# Patient Record
Sex: FEMALE | Race: BLACK OR AFRICAN AMERICAN | NOT HISPANIC OR LATINO | Employment: FULL TIME | URBAN - METROPOLITAN AREA
[De-identification: names, ages, dates, MRNs, and addresses within clinical notes are randomized per-mention and may not be internally consistent; named-entity substitution may affect disease eponyms.]

---

## 2018-01-20 ENCOUNTER — HOSPITAL ENCOUNTER (EMERGENCY)
Facility: CLINIC | Age: 28
Discharge: HOME | End: 2018-01-21
Attending: FAMILY MEDICINE | Admitting: FAMILY MEDICINE
Payer: COMMERCIAL

## 2018-01-20 DIAGNOSIS — R10.11 ABDOMINAL PAIN, RIGHT UPPER QUADRANT: ICD-10-CM

## 2018-01-20 LAB
ALBUMIN UR-MCNC: NEGATIVE MG/DL
APPEARANCE UR: CLEAR
BASOPHILS # BLD AUTO: 0 10E9/L (ref 0–0.2)
BASOPHILS NFR BLD AUTO: 0.3 %
BILIRUB UR QL STRIP: NEGATIVE
COLOR UR AUTO: YELLOW
DIFFERENTIAL METHOD BLD: NORMAL
EOSINOPHIL # BLD AUTO: 0.1 10E9/L (ref 0–0.7)
EOSINOPHIL NFR BLD AUTO: 1.5 %
ERYTHROCYTE [DISTWIDTH] IN BLOOD BY AUTOMATED COUNT: 12.9 % (ref 10–15)
GLUCOSE UR STRIP-MCNC: NEGATIVE MG/DL
HCG UR QL: NEGATIVE
HCT VFR BLD AUTO: 39.6 % (ref 35–47)
HGB BLD-MCNC: 12.9 G/DL (ref 11.7–15.7)
HGB UR QL STRIP: NEGATIVE
IMM GRANULOCYTES # BLD: 0 10E9/L (ref 0–0.4)
IMM GRANULOCYTES NFR BLD: 0.1 %
KETONES UR STRIP-MCNC: 5 MG/DL
LEUKOCYTE ESTERASE UR QL STRIP: NEGATIVE
LYMPHOCYTES # BLD AUTO: 2.4 10E9/L (ref 0.8–5.3)
LYMPHOCYTES NFR BLD AUTO: 35.4 %
MCH RBC QN AUTO: 29.1 PG (ref 26.5–33)
MCHC RBC AUTO-ENTMCNC: 32.6 G/DL (ref 31.5–36.5)
MCV RBC AUTO: 89 FL (ref 78–100)
MONOCYTES # BLD AUTO: 0.7 10E9/L (ref 0–1.3)
MONOCYTES NFR BLD AUTO: 9.6 %
NEUTROPHILS # BLD AUTO: 3.7 10E9/L (ref 1.6–8.3)
NEUTROPHILS NFR BLD AUTO: 53.1 %
NITRATE UR QL: NEGATIVE
NRBC # BLD AUTO: 0 10*3/UL
NRBC BLD AUTO-RTO: 0 /100
PH UR STRIP: 5.5 PH (ref 5–7)
PLATELET # BLD AUTO: 200 10E9/L (ref 150–450)
RBC # BLD AUTO: 4.43 10E12/L (ref 3.8–5.2)
SOURCE: ABNORMAL
SP GR UR STRIP: 1.01 (ref 1–1.03)
UROBILINOGEN UR STRIP-MCNC: NORMAL MG/DL (ref 0–2)
WBC # BLD AUTO: 6.9 10E9/L (ref 4–11)

## 2018-01-20 PROCEDURE — 80053 COMPREHEN METABOLIC PANEL: CPT | Performed by: STUDENT IN AN ORGANIZED HEALTH CARE EDUCATION/TRAINING PROGRAM

## 2018-01-20 PROCEDURE — 81025 URINE PREGNANCY TEST: CPT | Performed by: STUDENT IN AN ORGANIZED HEALTH CARE EDUCATION/TRAINING PROGRAM

## 2018-01-20 PROCEDURE — 85025 COMPLETE CBC W/AUTO DIFF WBC: CPT | Performed by: STUDENT IN AN ORGANIZED HEALTH CARE EDUCATION/TRAINING PROGRAM

## 2018-01-20 PROCEDURE — 81003 URINALYSIS AUTO W/O SCOPE: CPT | Performed by: STUDENT IN AN ORGANIZED HEALTH CARE EDUCATION/TRAINING PROGRAM

## 2018-01-20 PROCEDURE — 25000132 ZZH RX MED GY IP 250 OP 250 PS 637: Performed by: STUDENT IN AN ORGANIZED HEALTH CARE EDUCATION/TRAINING PROGRAM

## 2018-01-20 PROCEDURE — 99284 EMERGENCY DEPT VISIT MOD MDM: CPT | Mod: GC | Performed by: FAMILY MEDICINE

## 2018-01-20 PROCEDURE — 83690 ASSAY OF LIPASE: CPT | Performed by: STUDENT IN AN ORGANIZED HEALTH CARE EDUCATION/TRAINING PROGRAM

## 2018-01-20 PROCEDURE — 99285 EMERGENCY DEPT VISIT HI MDM: CPT | Performed by: FAMILY MEDICINE

## 2018-01-20 RX ORDER — HYDROXYZINE HYDROCHLORIDE 25 MG/1
25 TABLET, FILM COATED ORAL ONCE
Status: COMPLETED | OUTPATIENT
Start: 2018-01-20 | End: 2018-01-20

## 2018-01-20 RX ADMIN — HYDROXYZINE HYDROCHLORIDE 25 MG: 25 TABLET ORAL at 23:36

## 2018-01-20 ASSESSMENT — ENCOUNTER SYMPTOMS
CONFUSION: 0
NAUSEA: 1
COLOR CHANGE: 0
CHILLS: 1
FEVER: 0
NECK STIFFNESS: 0
ABDOMINAL DISTENTION: 1
ABDOMINAL PAIN: 1
SHORTNESS OF BREATH: 0
DIARRHEA: 0
HEADACHES: 0
VOMITING: 0
DYSURIA: 0
ARTHRALGIAS: 0
EYE REDNESS: 0

## 2018-01-20 NOTE — ED AVS SNAPSHOT
[facility], Emergency Department    [address]    [place] [address]    Phone:  [phone]    Fax:  [phone]                                       Gregorio Pastor   MRN: [id number]    Department:  [facility], Emergency Department   Date of Visit:  1/20/2018           Patient Information     Date Of Birth          1990        Your diagnoses for this visit were:     Abdominal pain, right upper quadrant        You were seen by Donavan Dailey MD.        Discharge Instructions         Please make an appointment to follow up with Your Primary Care Provider in 3-4 days unless symptoms completely resolve.      *Abdominal Pain, Unknown Cause (Female)    The exact cause of your abdominal (stomach) pain is not certain. This does not mean that this is something to worry about, or the right tests were not done. Everyone likes to know the exact cause of the problem, but sometimes with abdominal pain, there is no clear-cut cause, and this could be a good thing. The good news is that your symptoms can be treated, and you will feel better.   Your condition does not seem serious now; however, sometimes the signs of a serious problem may take more time to appear. For this reason, it is important for you to watch for any new symptoms, problems, or worsening of your condition.  Over the next few days, the abdominal pain may come and go, or be continuous. Other common symptoms can include nausea and vomiting. Sometimes it can be difficult to tell if you feel nauseous, you may just feel bad and not associate that feeling with nausea. Constipation, diarrhea, and a fever may go along with the pain.  The pain may continue even if treated correctly over the following days. Depending on how things go, sometimes the cause can become clear and may require further or different treatment. Additional evaluations, medications, or tests may be needed.  Home care  Your health care provider may prescribe medications  for pain, symptoms, or an infection.  Follow the health care provider's instructions for taking these medications.  General care    Rest until your next exam. No strenuous activities.    Try to find positions that ease discomfort. A small pillow placed on the abdomen may help relieve pain.    Something warm on your abdomen (such as a heating pad) may help, but be careful not to burn yourself.  Diet    Do not force yourself to eat, especially if having cramps, vomiting, or diarrhea.    Water is important so you do not get dehydrated. Soup may also be good. Sports drinks may also help, especially if they are not too acidic. Make sure you don't drink sugary drinks as this can make things worse. Take liquids in small amounts. Do not guzzle them.    Caffeine sometimes makes the pain and cramping worse.    Avoid dairy products if you have vomiting or diarrhea.    Don't eat large amounts at a time. Wait a few minutes between bites.    Eat a diet low in fiber (called a low-residue diet). Foods allowed include refined breads, white rice, fruit and vegetable juices without pulp, tender meats. These foods will pass more easily through the intestine.    Avoid fried or fatty foods, dairy, alcohol and spicy foods until your symptoms go away.  Follow-up care  Follow up with your health care provider as instructed, or if your pain does not begin to improve in the next 24 hours.  When to seek medical care  Seek prompt medical care if any of the following occur:    Pain gets worse or moves to the right lower abdomen    New or worsening vomiting or diarrhea    Swelling of the abdomen    Unable to pass stool for more than three days    New fever over 101  F (38.3 C), or rising fever    Blood in vomit or bowel movements (dark red or black color)    Jaundice (yellow color of eyes and skin)    Weakness, dizziness    Chest, arm, back, neck or jaw pain    Unexpected vaginal bleeding or missed period  Call 911  Call emergency services if any  of the following occur:    Trouble breathing    Confusion    Fainting or loss of consciousness    Rapid heart rate    Seizure    [id number] [employer], [address] [facility], [place] [address]. All rights reserved. This information is not intended as a substitute for professional medical care. Always follow your healthcare professional's instructions.          24 Hour Appointment Hotline       To make an appointment at any [facility], call [phone] ([phone]). If you don't have a family doctor or clinic, we will help you find one. [facility] are conveniently located to serve the needs of you and your family.             Review of your medicines      START taking        Dose / Directions Last dose taken    omeprazole 20 MG CR capsule   Commonly known as:  priLOSEC   Dose:  20 mg   Quantity:  60 capsule        Take 1 capsule (20 mg) by mouth 2 times daily   Refills:  0                Prescriptions were sent or printed at these locations (1 Prescription)                   Other Prescriptions                Printed at Department/Unit printer (1 of 1)         omeprazole (PRILOSEC) 20 MG CR capsule                Procedures and tests performed during your visit     CBC with platelets differential    Comprehensive metabolic panel    HCG qualitative urine (UPT)    Lipase    Saline Lock IV    UA reflex to Microscopic and Culture    US Abdomen Limited      Orders Needing Specimen Collection     None      Pending Results     No orders found for last 3 day(s).            Pending Culture Results     No orders found for last 3 day(s).            Pending Results Instructions     If you had any lab results that were not finalized at the time of your Discharge, you can call the ED Lab Result RN at [phone]. You will be contacted by this team for any positive Lab results or changes in treatment. The nurses are available 7 days a week from 10A to 6:30P.  You can leave a message 24 hours per day and they  "will return your call.        Thank you for choosing [facility]       Thank you for choosing [facility] for your care. Our goal is always to provide you with excellent care. Hearing back from our patients is one way we can continue to improve our services. Please take a few minutes to complete the written survey that you may receive in the mail after you visit with us. Thank you!        [employer] Information     [employer] lets you send messages to your doctor, view your test results, renew your prescriptions, schedule appointments and more. To sign up, go to [email or web address] . Click on \"Log in\" on the left side of the screen, which will take you to the Welcome page. Then click on \"Sign up Now\" on the right side of the page.     You will be asked to enter the access code listed below, as well as some personal information. Please follow the directions to create your username and password.     Your access code is: [email or web address]  Expires: 2018 12:52 AM     Your access code will  in 90 days. If you need help or a new code, please call your [facility] clinic or [phone].        Care EveryWhere ID     This is your Care EveryWhere ID. This could be used by other organizations to access your [facility] medical records  [id number]        Equal Access to Services     KAR MASON : Amanda hillo [place], waaxda luqadaha, qaybta kaalmada adeegyada, adolfo ibanez. So [facility] [phone].    ATENCIÓN: Si habla español, tiene a robles disposición servicios gratuitos de asistencia lingüística. Llame al [phone].    We comply with applicable federal civil rights laws and [place] laws. We do not discriminate on the basis of race, color, national origin, age, disability, sex, sexual orientation, or gender identity.            After Visit Summary       This is your record. Keep this with you and show to your community pharmacist(s) and doctor(s) at your next visit.                  "

## 2018-01-20 NOTE — ED AVS SNAPSHOT
[facility], Emergency Department    [address]    [facility] [address]    Phone:  [phone]    Fax:  [phone]                                       Gregorio Pastor   MRN: [id number]    Department:  [facility], Emergency Department   Date of Visit:  1/20/2018           After Visit Summary Signature Page     I have received my discharge instructions, and my questions have been answered. I have discussed any challenges I see with this plan with the nurse or doctor.    ..........................................................................................................................................  Patient/Patient Representative Signature      ..........................................................................................................................................  Patient Representative Print Name and Relationship to Patient    ..................................................               ................................................  Date                                            Time    ..........................................................................................................................................  Reviewed by Signature/Title    ...................................................              ..............................................  Date                                                            Time

## 2018-01-21 ENCOUNTER — APPOINTMENT (OUTPATIENT)
Dept: ULTRASOUND IMAGING | Facility: CLINIC | Age: 28
End: 2018-01-21
Payer: COMMERCIAL

## 2018-01-21 VITALS
OXYGEN SATURATION: 100 % | HEART RATE: 77 BPM | RESPIRATION RATE: 16 BRPM | TEMPERATURE: 97.8 F | WEIGHT: 189 LBS | SYSTOLIC BLOOD PRESSURE: 109 MMHG | DIASTOLIC BLOOD PRESSURE: 71 MMHG

## 2018-01-21 LAB
ALBUMIN SERPL-MCNC: 3.9 G/DL (ref 3.4–5)
ALP SERPL-CCNC: 106 U/L (ref 40–150)
ALT SERPL W P-5'-P-CCNC: 12 U/L (ref 0–50)
ANION GAP SERPL CALCULATED.3IONS-SCNC: 7 MMOL/L (ref 3–14)
AST SERPL W P-5'-P-CCNC: 16 U/L (ref 0–45)
BILIRUB SERPL-MCNC: 0.6 MG/DL (ref 0.2–1.3)
BUN SERPL-MCNC: 12 MG/DL (ref 7–30)
CALCIUM SERPL-MCNC: 8.5 MG/DL (ref 8.5–10.1)
CHLORIDE SERPL-SCNC: 106 MMOL/L (ref 94–109)
CO2 SERPL-SCNC: 27 MMOL/L (ref 20–32)
CREAT SERPL-MCNC: 0.58 MG/DL (ref 0.52–1.04)
GFR SERPL CREATININE-BSD FRML MDRD: >90 ML/MIN/1.7M2
GLUCOSE SERPL-MCNC: 85 MG/DL (ref 70–99)
LIPASE SERPL-CCNC: 83 U/L (ref 73–393)
POTASSIUM SERPL-SCNC: 3.3 MMOL/L (ref 3.4–5.3)
PROT SERPL-MCNC: 8.5 G/DL (ref 6.8–8.8)
SODIUM SERPL-SCNC: 140 MMOL/L (ref 133–144)

## 2018-01-21 PROCEDURE — 25000125 ZZHC RX 250: Performed by: STUDENT IN AN ORGANIZED HEALTH CARE EDUCATION/TRAINING PROGRAM

## 2018-01-21 PROCEDURE — 76705 ECHO EXAM OF ABDOMEN: CPT

## 2018-01-21 PROCEDURE — 25000132 ZZH RX MED GY IP 250 OP 250 PS 637: Performed by: STUDENT IN AN ORGANIZED HEALTH CARE EDUCATION/TRAINING PROGRAM

## 2018-01-21 RX ADMIN — LIDOCAINE HYDROCHLORIDE 30 ML: 20 SOLUTION ORAL; TOPICAL at 00:32

## 2018-01-21 NOTE — ED PROVIDER NOTES
"  History     Chief Complaint   Patient presents with     Abdominal Pain     Onset today with increasing right sided abdominal pain, feeling of \"itching\" all over, nausea, burping and dizziness.     TITA Pastor is a 27 year old female who presents with chief complaint of right upper quadrant abdominal pain.  Patient reports having the pain for about the past week, notes that her pain is worse with food.  She reports being treated for H. pylori in the past but reports this pain is in a different location.  Her H. pylori pain was more epigastric in location and this is more right upper quadrant.  Patient also endorses itching and nausea which have been going on for about the same time is abdominal pain.  Patient reports feeling bloated and \"Burpy\".  Patient reports radiation of the pain into her right shoulder.  Patient denies any alleviating factors.  Patient has been feeling very nauseous but has not vomited.  She denies diarrhea.  Patient's last menstrual period was between 2 and 3 weeks ago.    I have reviewed the Medications, Allergies, Past Medical and Surgical History, and Social History in the Epic system.    Review of Systems   Constitutional: Positive for chills. Negative for fever.   HENT: Negative for congestion.    Eyes: Negative for redness.   Respiratory: Negative for shortness of breath.    Cardiovascular: Negative for chest pain.   Gastrointestinal: Positive for abdominal distention, abdominal pain and nausea. Negative for diarrhea and vomiting.   Genitourinary: Negative for dysuria.   Musculoskeletal: Negative for arthralgias and neck stiffness.   Skin: Negative for color change.   Neurological: Negative for headaches.   Psychiatric/Behavioral: Negative for confusion.       Physical Exam   BP: 115/87  Pulse: 77  Heart Rate: 77  Temp: 98.5  F (36.9  C)  Resp: 16  Weight: 85.7 kg (189 lb)  SpO2: 98 %      Physical Exam   Constitutional: She is oriented to person, place, and time. No distress. "   HENT:   Head: Atraumatic.   Mouth/Throat: Oropharynx is clear and moist. No oropharyngeal exudate.   Eyes: Pupils are equal, round, and reactive to light. Right eye exhibits no discharge. Left eye exhibits no discharge. No scleral icterus.   Cardiovascular: Normal heart sounds and intact distal pulses.    Pulmonary/Chest: Breath sounds normal. No respiratory distress.   Abdominal: Soft. Bowel sounds are normal. She exhibits no mass. There is tenderness (right upper quadrant, equivocal murphys). There is no rebound and no guarding.   Musculoskeletal: She exhibits no edema or tenderness.   Neurological: She is alert and oriented to person, place, and time.   Skin: Skin is warm. No rash noted. She is not diaphoretic.       ED Course     ED Course     Procedures             Critical Care time:  none         Results for orders placed or performed during the hospital encounter of 01/20/18   US Abdomen Limited    Narrative    US ABDOMEN LIMITED  1/21/2018 12:11 AM      HISTORY: Right upper quadrant pain.     COMPARISON: None.    FINDINGS: The liver is normal in size and texture without focal mass.  There is no intra or extrahepatic biliary dilatation. The common  hepatic duct measures 0.2 cm.  The gallbladder is normal in appearance  without gallstones.  The pancreas head and body appear normal. The  tail is obscured by bowel gas.  The right kidney measures 9.3 cm and  is normal in appearance. The lower pole of the kidney is obscured by  bowel gas.       Impression    IMPRESSION:  Normal right upper quadrant. No gallstone or biliary dilatation.    VIKI GUTIERREZ MD            Labs Ordered and Resulted from Time of ED Arrival Up to the Time of Departure from the ED   COMPREHENSIVE METABOLIC PANEL - Abnormal; Notable for the following:        Result Value    Potassium 3.3 (*)     All other components within normal limits   UA MACROSCOPIC WITH REFLEX TO MICRO AND CULTURE - Abnormal; Notable for the following:     Ketones  Urine 5 (*)     All other components within normal limits   CBC WITH PLATELETS DIFFERENTIAL   LIPASE   HCG QUALITATIVE URINE   MAY SALINE LOCK IV            Assessments & Plan (with Medical Decision Making)   Patient is a 19-year-old female who presents with chief complaint of right upper quadrant pain.  Patient has a history of H. pylori gastritis and was previously treated with triple antibiotic therapy.  Patient reports increased pain after meals.  Pain is located in the right upper quadrant and radiates to her right shoulder.  Ultrasound of the right upper quadrant was negative for gallstones or other biliary disease.  Laboratory evaluation including metabolic panel and CBC were unremarkable.  This is likely gastritis especially in the setting of her previously diagnosed H. pylori gastritis.  Symptoms resolved completely with GI cocktail.  Recommend patient continue a PPI and follow-up with her primary care physician in the next week unless symptoms completely resolved.  Pregnancy test was negative    I have reviewed the nursing notes.    I have reviewed the findings, diagnosis, plan and need for follow up with the patient.    This data collected with the Resident working in the Emergency Department.  Patient was seen and evaluated by myself and I repeated the history and physical exam with the patient.  The plan of care was discussed with them.  The key portions of the note including the entire assessment and plan reflect my documentation.        Discharge Medication List as of 1/21/2018 12:53 AM      START taking these medications    Details   omeprazole (PRILOSEC) 20 MG CR capsule Take 1 capsule (20 mg) by mouth 2 times daily, Disp-60 capsule, R-0, Local Print             Final diagnoses:   Abdominal pain, right upper quadrant     Dax Hall D.O.  Family Medicine G1  [phone]  1/20/2018   [facility], [place], EMERGENCY DEPARTMENT     Donavan Dailey MD  01/24/18 7009

## 2018-01-21 NOTE — DISCHARGE INSTRUCTIONS
Please make an appointment to follow up with Your Primary Care Provider in 3-4 days unless symptoms completely resolve.      *Abdominal Pain, Unknown Cause (Female)    The exact cause of your abdominal (stomach) pain is not certain. This does not mean that this is something to worry about, or the right tests were not done. Everyone likes to know the exact cause of the problem, but sometimes with abdominal pain, there is no clear-cut cause, and this could be a good thing. The good news is that your symptoms can be treated, and you will feel better.   Your condition does not seem serious now; however, sometimes the signs of a serious problem may take more time to appear. For this reason, it is important for you to watch for any new symptoms, problems, or worsening of your condition.  Over the next few days, the abdominal pain may come and go, or be continuous. Other common symptoms can include nausea and vomiting. Sometimes it can be difficult to tell if you feel nauseous, you may just feel bad and not associate that feeling with nausea. Constipation, diarrhea, and a fever may go along with the pain.  The pain may continue even if treated correctly over the following days. Depending on how things go, sometimes the cause can become clear and may require further or different treatment. Additional evaluations, medications, or tests may be needed.  Home care  Your health care provider may prescribe medications for pain, symptoms, or an infection.  Follow the health care provider's instructions for taking these medications.  General care    Rest until your next exam. No strenuous activities.    Try to find positions that ease discomfort. A small pillow placed on the abdomen may help relieve pain.    Something warm on your abdomen (such as a heating pad) may help, but be careful not to burn yourself.  Diet    Do not force yourself to eat, especially if having cramps, vomiting, or diarrhea.    Water is important so you do  not get dehydrated. Soup may also be good. Sports drinks may also help, especially if they are not too acidic. Make sure you don't drink sugary drinks as this can make things worse. Take liquids in small amounts. Do not guzzle them.    Caffeine sometimes makes the pain and cramping worse.    Avoid dairy products if you have vomiting or diarrhea.    Don't eat large amounts at a time. Wait a few minutes between bites.    Eat a diet low in fiber (called a low-residue diet). Foods allowed include refined breads, white rice, fruit and vegetable juices without pulp, tender meats. These foods will pass more easily through the intestine.    Avoid fried or fatty foods, dairy, alcohol and spicy foods until your symptoms go away.  Follow-up care  Follow up with your health care provider as instructed, or if your pain does not begin to improve in the next 24 hours.  When to seek medical care  Seek prompt medical care if any of the following occur:    Pain gets worse or moves to the right lower abdomen    New or worsening vomiting or diarrhea    Swelling of the abdomen    Unable to pass stool for more than three days    New fever over 101  F (38.3 C), or rising fever    Blood in vomit or bowel movements (dark red or black color)    Jaundice (yellow color of eyes and skin)    Weakness, dizziness    Chest, arm, back, neck or jaw pain    Unexpected vaginal bleeding or missed period  Call 911  Call emergency services if any of the following occur:    Trouble breathing    Confusion    Fainting or loss of consciousness    Rapid heart rate    Seizure    [id number] [employer] [facility], [address] [place] [address], [place] [address]. All rights reserved. This information is not intended as a substitute for professional medical care. Always follow your healthcare professional's instructions.

## 2018-04-20 ENCOUNTER — APPOINTMENT (OUTPATIENT)
Dept: ULTRASOUND IMAGING | Facility: CLINIC | Age: 28
End: 2018-04-20
Attending: FAMILY MEDICINE
Payer: COMMERCIAL

## 2018-04-20 ENCOUNTER — HOSPITAL ENCOUNTER (EMERGENCY)
Facility: CLINIC | Age: 28
Discharge: HOME | End: 2018-04-21
Attending: FAMILY MEDICINE | Admitting: FAMILY MEDICINE
Payer: COMMERCIAL

## 2018-04-20 DIAGNOSIS — R10.31 ABDOMINAL PAIN, RIGHT LOWER QUADRANT: ICD-10-CM

## 2018-04-20 DIAGNOSIS — R51.9 HEADACHE IN PREGNANCY, ANTEPARTUM, SECOND TRIMESTER: ICD-10-CM

## 2018-04-20 DIAGNOSIS — Z3A.16 16 WEEKS GESTATION OF PREGNANCY: ICD-10-CM

## 2018-04-20 DIAGNOSIS — O26.892 HEADACHE IN PREGNANCY, ANTEPARTUM, SECOND TRIMESTER: ICD-10-CM

## 2018-04-20 PROCEDURE — 99284 EMERGENCY DEPT VISIT MOD MDM: CPT | Mod: 25

## 2018-04-20 PROCEDURE — 76770 US EXAM ABDO BACK WALL COMP: CPT

## 2018-04-20 PROCEDURE — 76815 OB US LIMITED FETUS(S): CPT

## 2018-04-20 PROCEDURE — 76705 ECHO EXAM OF ABDOMEN: CPT

## 2018-04-20 PROCEDURE — 99284 EMERGENCY DEPT VISIT MOD MDM: CPT | Mod: Z6 | Performed by: FAMILY MEDICINE

## 2018-04-20 ASSESSMENT — ENCOUNTER SYMPTOMS
ABDOMINAL PAIN: 1
DYSURIA: 0

## 2018-04-20 NOTE — ED AVS SNAPSHOT
[facility], [place], Emergency Department    [address]    [facility] [address]    Phone:  [phone]    Fax:  [phone]                                       Gregorio Pastor   MRN: [id number]    Department:  [facility], Emergency Department   Date of Visit:  4/20/2018           After Visit Summary Signature Page     I have received my discharge instructions, and my questions have been answered. I have discussed any challenges I see with this plan with the nurse or doctor.    ..........................................................................................................................................  Patient/Patient Representative Signature      ..........................................................................................................................................  Patient Representative Print Name and Relationship to Patient    ..................................................               ................................................  Date                                            Time    ..........................................................................................................................................  Reviewed by Signature/Title    ...................................................              ..............................................  Date                                                            Time

## 2018-04-20 NOTE — ED AVS SNAPSHOT
[facility], Emergency Department    [address]    [place] [address]    Phone:  [phone]    Fax:  [phone]                                       Gregorio Pastor   MRN: [id number]    Department:  [facility], Emergency Department   Date of Visit:  4/20/2018           Patient Information     Date Of Birth          1990        Your diagnoses for this visit were:     Abdominal pain, right lower quadrant     16 weeks gestation of pregnancy        You were seen by Donavan Dailey MD and Renu Hummel MD.        Discharge Instructions       Thank you for your patience today.  Please follow-up with your regular doctor or ob/gyn in the next 1-2 days for further evaluation and follow-up care.  Please call to schedule an appointment.  Please continue your own medications.  Please return to the ER if you develop a fever, worsening pain, vomiting, or any worsening of your current symptoms.  It was a pleasure taking care of you today.  We hope you feel better soon.      Discharge References/Attachments     ABDOMINAL PAIN, POSSIBLE APPENDICITIS, REPEAT EXAM (FEMALE) (ENGLISH)      24 Hour Appointment Hotline       To make an appointment at any [facility] clinic, call [phone] ([phone]). If you don't have a family doctor or clinic, we will help you find one. [facility] clinics are conveniently located to serve the needs of you and your family.             Review of your medicines      Notice     You have not been prescribed any medications.            Procedures and tests performed during your visit     Abdomen US, limited (RUQ only)    CBC with platelets differential    Comprehensive metabolic panel    Retroperitoneal US    UA with Microscopic reflex to Culture    US OB Limited One Or More Fetuses      Orders Needing Specimen Collection     None      Pending Results     No orders found for last 3 day(s).            Pending Culture Results     No orders found for last 3 day(s).  "           Pending Results Instructions     If you had any lab results that were not finalized at the time of your Discharge, you can call the ED Lab Result RN at [phone]. You will be contacted by this team for any positive Lab results or changes in treatment. The nurses are available 7 days a week from 10A to 6:30P.  You can leave a message 24 hours per day and they will return your call.        Thank you for choosing [facility]       Thank you for choosing [facility] for your care. Our goal is always to provide you with excellent care. Hearing back from our patients is one way we can continue to improve our services. Please take a few minutes to complete the written survey that you may receive in the mail after you visit with us. Thank you!        [employer] Information     [employer] lets you send messages to your doctor, view your test results, renew your prescriptions, schedule appointments and more. To sign up, go to [email or web address] . Click on \"Log in\" on the left side of the screen, which will take you to the Welcome page. Then click on \"Sign up Now\" on the right side of the page.     You will be asked to enter the access code listed below, as well as some personal information. Please follow the directions to create your username and password.     Your access code is: [email or web address]  Expires: 2018  2:58 AM     Your access code will  in 90 days. If you need help or a new code, please call your [facility] clinic or [phone].        Care EveryWhere ID     This is your Care EveryWhere ID. This could be used by other organizations to access your [facility] medical records  [id number]        Equal Access to Services     [facility] Center[facility] : Hadii yohana Snowden, waaxda luqadaha, qaybta kaaladolfo pugh. So [facility] [phone].    ATENCIÓN: Si habla español, tiene a robles disposición servicios gratuitos de asistencia lingüística. Llame al [phone].    We " comply with applicable federal civil rights laws and [place] laws. We do not discriminate on the basis of race, color, national origin, age, disability, sex, sexual orientation, or gender identity.            After Visit Summary       This is your record. Keep this with you and show to your community pharmacist(s) and doctor(s) at your next visit.

## 2018-04-21 VITALS
OXYGEN SATURATION: 100 % | SYSTOLIC BLOOD PRESSURE: 118 MMHG | RESPIRATION RATE: 18 BRPM | WEIGHT: 205.3 LBS | DIASTOLIC BLOOD PRESSURE: 75 MMHG | TEMPERATURE: 97.2 F | HEART RATE: 89 BPM

## 2018-04-21 LAB
ALBUMIN SERPL-MCNC: 2.9 G/DL (ref 3.4–5)
ALBUMIN UR-MCNC: NEGATIVE MG/DL
ALP SERPL-CCNC: 73 U/L (ref 40–150)
ALT SERPL W P-5'-P-CCNC: 12 U/L (ref 0–50)
ANION GAP SERPL CALCULATED.3IONS-SCNC: 9 MMOL/L (ref 3–14)
APPEARANCE UR: CLEAR
AST SERPL W P-5'-P-CCNC: 19 U/L (ref 0–45)
BASOPHILS # BLD AUTO: 0 10E9/L (ref 0–0.2)
BASOPHILS NFR BLD AUTO: 0.1 %
BILIRUB SERPL-MCNC: 0.2 MG/DL (ref 0.2–1.3)
BILIRUB UR QL STRIP: NEGATIVE
BUN SERPL-MCNC: 5 MG/DL (ref 7–30)
CALCIUM SERPL-MCNC: 8.7 MG/DL (ref 8.5–10.1)
CHLORIDE SERPL-SCNC: 110 MMOL/L (ref 94–109)
CO2 SERPL-SCNC: 22 MMOL/L (ref 20–32)
COLOR UR AUTO: YELLOW
CREAT SERPL-MCNC: 0.36 MG/DL (ref 0.52–1.04)
DIFFERENTIAL METHOD BLD: NORMAL
EOSINOPHIL # BLD AUTO: 0.1 10E9/L (ref 0–0.7)
EOSINOPHIL NFR BLD AUTO: 1.1 %
ERYTHROCYTE [DISTWIDTH] IN BLOOD BY AUTOMATED COUNT: 12.7 % (ref 10–15)
GFR SERPL CREATININE-BSD FRML MDRD: >90 ML/MIN/1.7M2
GLUCOSE SERPL-MCNC: 80 MG/DL (ref 70–99)
GLUCOSE UR STRIP-MCNC: NEGATIVE MG/DL
HCT VFR BLD AUTO: 39.6 % (ref 35–47)
HGB BLD-MCNC: 13.2 G/DL (ref 11.7–15.7)
HGB UR QL STRIP: NEGATIVE
IMM GRANULOCYTES # BLD: 0 10E9/L (ref 0–0.4)
IMM GRANULOCYTES NFR BLD: 0.2 %
KETONES UR STRIP-MCNC: NEGATIVE MG/DL
LEUKOCYTE ESTERASE UR QL STRIP: NEGATIVE
LYMPHOCYTES # BLD AUTO: 2.4 10E9/L (ref 0.8–5.3)
LYMPHOCYTES NFR BLD AUTO: 28.2 %
MCH RBC QN AUTO: 29.4 PG (ref 26.5–33)
MCHC RBC AUTO-ENTMCNC: 33.3 G/DL (ref 31.5–36.5)
MCV RBC AUTO: 88 FL (ref 78–100)
MONOCYTES # BLD AUTO: 0.7 10E9/L (ref 0–1.3)
MONOCYTES NFR BLD AUTO: 7.9 %
MUCOUS THREADS #/AREA URNS LPF: PRESENT /LPF
NEUTROPHILS # BLD AUTO: 5.3 10E9/L (ref 1.6–8.3)
NEUTROPHILS NFR BLD AUTO: 62.5 %
NITRATE UR QL: NEGATIVE
NRBC # BLD AUTO: 0 10*3/UL
NRBC BLD AUTO-RTO: 0 /100
PH UR STRIP: 6 PH (ref 5–7)
PLATELET # BLD AUTO: 187 10E9/L (ref 150–450)
POTASSIUM SERPL-SCNC: 3.6 MMOL/L (ref 3.4–5.3)
PROT SERPL-MCNC: 7.4 G/DL (ref 6.8–8.8)
RBC # BLD AUTO: 4.49 10E12/L (ref 3.8–5.2)
RBC #/AREA URNS AUTO: <1 /HPF (ref 0–2)
SODIUM SERPL-SCNC: 141 MMOL/L (ref 133–144)
SOURCE: ABNORMAL
SP GR UR STRIP: 1.01 (ref 1–1.03)
SQUAMOUS #/AREA URNS AUTO: 3 /HPF (ref 0–1)
UROBILINOGEN UR STRIP-MCNC: NORMAL MG/DL (ref 0–2)
WBC # BLD AUTO: 8.5 10E9/L (ref 4–11)
WBC #/AREA URNS AUTO: <1 /HPF (ref 0–5)

## 2018-04-21 PROCEDURE — 85025 COMPLETE CBC W/AUTO DIFF WBC: CPT | Performed by: FAMILY MEDICINE

## 2018-04-21 PROCEDURE — 80053 COMPREHEN METABOLIC PANEL: CPT | Performed by: FAMILY MEDICINE

## 2018-04-21 PROCEDURE — 81001 URINALYSIS AUTO W/SCOPE: CPT | Performed by: FAMILY MEDICINE

## 2018-04-21 RX ORDER — IOPAMIDOL 755 MG/ML
100 INJECTION, SOLUTION INTRAVASCULAR ONCE
Status: DISCONTINUED | OUTPATIENT
Start: 2018-04-21 | End: 2018-04-21 | Stop reason: CLARIF

## 2018-04-21 NOTE — ED PROVIDER NOTES
[facility] EMERGENCY DEPARTMENT ([facility])    18       History     Chief Complaint   Patient presents with     Threatened Miscarriage     Patient reports right sided abdominal pain x1 week, reports today her midwife was unable to hear fetal heart tones after previously being able to.  Patient reports abdominal cramping today.  Patient is 14 weeks pregnant/  Patient denies vaginal bleeding     HPI  Gregorio Pastor is a 27 year old  female who is approximately 14 weeks gestation by LMP and presents to the Emergency Department for evaluation of RLQ abdominal pain.  The patient states that she has had this pain for the past week and notes that the pain radiates into her right back.  She denies any vaginal bleeding or burning with urination.  Patient denies any history of miscarriage.  The patient reports that she has not had an ultrasound performed yet with this pregnancy.    I have reviewed the Medications, Allergies, Past Medical and Surgical History, and Social History in the Epic system.    History reviewed. No pertinent past medical history.    History reviewed. No pertinent surgical history.    No family history on file.    Social History   Substance Use Topics     Smoking status: Not on file     Smokeless tobacco: Never Used     Alcohol use No       No current facility-administered medications for this encounter.      No current outpatient prescriptions on file.      No Known Allergies      Review of Systems   Gastrointestinal: Positive for abdominal pain (RLQ).   Genitourinary: Negative for dysuria and vaginal bleeding.   All other systems reviewed and are negative.      Physical Exam   BP: 117/41  Pulse: 69  Temp: 96.2  F (35.7  C)  Resp: 16  Weight: 93.1 kg (205 lb 4.8 oz)  SpO2: 99 %      Physical Exam   Constitutional: She is oriented to person, place, and time. No distress.   HENT:   Head: Atraumatic.   Mouth/Throat: Oropharynx is clear and moist. No oropharyngeal exudate.   Eyes: Pupils  are equal, round, and reactive to light. No scleral icterus.   Cardiovascular: Normal heart sounds and intact distal pulses.    Pulmonary/Chest: Breath sounds normal. No respiratory distress.   Abdominal: Soft. Bowel sounds are normal. There is tenderness in the right lower quadrant. There is guarding. There is no CVA tenderness.   Musculoskeletal: She exhibits no edema or tenderness.   Neurological: She is alert and oriented to person, place, and time. No cranial nerve deficit. She exhibits normal muscle tone. Coordination normal.   Skin: Skin is warm. No rash noted. She is not diaphoretic.       ED Course   9:24 PM  The patient was seen and examined by Donavan Dailey MD in Room ED20.     ED Course     Procedures         Critical Care time:  none       Labs/Imaging:    Results for orders placed or performed during the hospital encounter of 04/20/18 (from the past 24 hour(s))   Retroperitoneal US    Narrative    US RENAL COMPLETE 4/20/2018 11:23 PM    HISTORY: Right lower quadrant pain. Currently pregnant.    COMPARISON: None.    FINDINGS: The right kidney measures 11.0 cm. Echogenicity is normal.  No hydronephrosis. No dominant renal lesion.    The left kidney measures 12.4 cm. Echogenicity is normal. No  hydronephrosis. No dominant renal lesion.    Urinary bladder is distended with normal wall thickness.      Impression    IMPRESSION:  Normal renal ultrasound.    JOB HIGGINS MD   US OB Limited One Or More Fetuses    Narrative    US OB LIMITED ONE OR MORE FETUSES  4/20/2018 11:47 PM    HISTORY: Right lower quadrant pain. Pregnant.    COMPARISON: None.    FINDINGS:     Presentation: Transverse.  Cardiac activity: 135 bpm. Regular rhythm.  Movement: Unremarkable.  Placenta: Posterior. Lower uterine segment contraction precludes  evaluation for placenta previa.  Adnexa: Unremarkable.   Cervical length: 5.7 cm.   Amniotic fluid: Subjectively normal.     Other findings: None.  A complete anatomy scan was not  performed.     Measured parameters:       BPD:  3.3 cm      Age: 16 weeks 3 days       HC:    12.7 cm      Age: 16 weeks 4 days.       AC:  9.9 cm      Age: 16 weeks 0 days.       FL:   2.1 cm      Age: 16 weeks 2 days.    Gestational age by current ultrasound measurement: 16 weeks 3 days,  corresponding to an BRITNEY of 10/2/2018.    Estimated fetal weight: 146 grams, corresponding to the 51st  percentile for a 16 week, 0 day gestation.      Impression    IMPRESSION:    1. Single live intrauterine pregnancy of 16 weeks 3 days gestation by  current ultrasound measurement.  2. No specific evidence of placenta previa, however, contraction of  the lower uterine segment during the exam limits evaluation.    JOB HIGGINS MD   Abdomen US, limited (RUQ only)    Narrative    US ABDOMEN LIMITED 4/20/2018 11:57 PM    HISTORY: Right lower quadrant pain. Evaluate for appendicitis.    COMPARISON: None.    FINDINGS: The appendix is not visualized. No free fluid or other  abnormality in the right lower quadrant.      Impression    IMPRESSION: No evidence of appendicitis.    JOB HIGGINS MD   CBC with platelets differential   Result Value Ref Range    WBC 8.5 4.0 - 11.0 10e9/L    RBC Count 4.49 3.8 - 5.2 10e12/L    Hemoglobin 13.2 11.7 - 15.7 g/dL    Hematocrit 39.6 35.0 - 47.0 %    MCV 88 78 - 100 fl    MCH 29.4 26.5 - 33.0 pg    MCHC 33.3 31.5 - 36.5 g/dL    RDW 12.7 10.0 - 15.0 %    Platelet Count 187 150 - 450 10e9/L    Diff Method Automated Method     % Neutrophils 62.5 %    % Lymphocytes 28.2 %    % Monocytes 7.9 %    % Eosinophils 1.1 %    % Basophils 0.1 %    % Immature Granulocytes 0.2 %    Nucleated RBCs 0 0 /100    Absolute Neutrophil 5.3 1.6 - 8.3 10e9/L    Absolute Lymphocytes 2.4 0.8 - 5.3 10e9/L    Absolute Monocytes 0.7 0.0 - 1.3 10e9/L    Absolute Eosinophils 0.1 0.0 - 0.7 10e9/L    Absolute Basophils 0.0 0.0 - 0.2 10e9/L    Abs Immature Granulocytes 0.0 0 - 0.4 10e9/L    Absolute Nucleated RBC 0.0    Comprehensive  metabolic panel   Result Value Ref Range    Sodium 141 133 - 144 mmol/L    Potassium 3.6 3.4 - 5.3 mmol/L    Chloride 110 (H) 94 - 109 mmol/L    Carbon Dioxide 22 20 - 32 mmol/L    Anion Gap 9 3 - 14 mmol/L    Glucose 80 70 - 99 mg/dL    Urea Nitrogen 5 (L) 7 - 30 mg/dL    Creatinine 0.36 (L) 0.52 - 1.04 mg/dL    GFR Estimate >90 >60 mL/min/1.7m2    GFR Estimate If Black >90 >60 mL/min/1.7m2    Calcium 8.7 8.5 - 10.1 mg/dL    Bilirubin Total 0.2 0.2 - 1.3 mg/dL    Albumin 2.9 (L) 3.4 - 5.0 g/dL    Protein Total 7.4 6.8 - 8.8 g/dL    Alkaline Phosphatase 73 40 - 150 U/L    ALT 12 0 - 50 U/L    AST 19 0 - 45 U/L   UA with Microscopic reflex to Culture   Result Value Ref Range    Color Urine Yellow     Appearance Urine Clear     Glucose Urine Negative NEG^Negative mg/dL    Bilirubin Urine Negative NEG^Negative    Ketones Urine Negative NEG^Negative mg/dL    Specific Gravity Urine 1.014 1.003 - 1.035    Blood Urine Negative NEG^Negative    pH Urine 6.0 5.0 - 7.0 pH    Protein Albumin Urine Negative NEG^Negative mg/dL    Urobilinogen mg/dL Normal 0.0 - 2.0 mg/dL    Nitrite Urine Negative NEG^Negative    Leukocyte Esterase Urine Negative NEG^Negative    Source Midstream Urine     WBC Urine <1 0 - 5 /HPF    RBC Urine <1 0 - 2 /HPF    Squamous Epithelial /HPF Urine 3 (H) 0 - 1 /HPF    Mucous Urine Present (A) NEG^Negative /LPF           Assessments & Plan (with Medical Decision Making)       I have reviewed the nursing notes.    I have reviewed the findings, diagnosis, plan and need for follow up with the patient.  Patient with 16 week gestation pregnancy now presenting with day and a half history of right lower quadrant pain that is increasing in its intensity at this time workup including normal white count ultrasound of both kidneys right lower quadrant and uterus reveal a normal 16 week gestation pregnancy there was no initial evidence of any appendicitis however in light of the fact the patient continues to have  increasing right lower quadrant pain I felt as though was necessary to rule this out.  Patient will undergo IV contrast CT of the abdomen and pelvis to rule out possible appendicitis.  Patient is aware of possible risks with the pregnancy however at this time I believe the risks of possible appendicitis outweigh any concerns of imaging.  Patient will be signed out to night staff Dr. Hummel and she will follow-up if CT is negative patient will be discharged to home with likely round ligament pain with plan to follow-up with OB/GYN.    New Prescriptions    No medications on file       Final diagnoses:   Abdominal pain, right lower quadrant   16 weeks gestation of pregnancy     ITj, am serving as a trained medical scribe to document services personally performed by Donavan Dailey MD, based on the provider's statements to me.   Donavan LACKEY MD, was physically present and have reviewed and verified the accuracy of this note documented by Tj Retana.    4/20/2018   [facility], EMERGENCY DEPARTMENT     Donavan Dailey MD  04/21/18 0122

## 2018-04-21 NOTE — DISCHARGE INSTRUCTIONS
Thank you for your patience today.  Please follow-up with your regular doctor or ob/gyn in the next 1-2 days for further evaluation and follow-up care.  Please call to schedule an appointment.  Please continue your own medications.  Please return to the ER if you develop a fever, worsening pain, vomiting, or any worsening of your current symptoms.  It was a pleasure taking care of you today.  We hope you feel better soon.

## 2018-04-21 NOTE — ED NOTES
.SIGN-OUT:  - Assumed care of this patient from Dr. Dailey  -Patient is a 27-year-old  female currently 16 weeks pregnant who presents emergency department with right lower quadrant abdominal pain.  - Pending at shift change: CT scan of the abdomen pelvis to rule out appendicitis  - Tentative plan: Likely discharge home if CT negative for acute appendicitis     REASSESSMENT:  -Patient decided she did not want the CT due to radiation risk to fetus.  She reports she is feeling better with improvement in pain and on reexamination abdomen is soft, very mild tenderness to palpation in the right lower quadrant with no rebound, no guarding, no peritoneal signs.  Patient with no leukocytosis and is clinically well-appearing.  At this time I believe it is reasonable to discharge home with continue close follow-up and return if high fever, persistent vomiting, severe pain, any worsening symptoms.  Patient understands and agrees with the plan.     DISPOSITION:  - Patient discharged and will have close follow up with ob/gyn       Renu Hummel MD  [phone]

## 2018-04-21 NOTE — ED TRIAGE NOTES
Patient is 14 weeks pregnant, reports right side abdominal pain x1 week which increased and become cramping today.  Patient reports her midwife was not able to hear fetal heart tones today after previously being able to.

## 2022-03-09 PROCEDURE — 99284 EMERGENCY DEPT VISIT MOD MDM: CPT | Performed by: EMERGENCY MEDICINE

## 2022-03-09 PROCEDURE — 99284 EMERGENCY DEPT VISIT MOD MDM: CPT | Mod: 25 | Performed by: EMERGENCY MEDICINE

## 2022-03-09 RX ORDER — LORATADINE 10 MG/1
10 TABLET ORAL DAILY
COMMUNITY

## 2022-03-10 ENCOUNTER — APPOINTMENT (OUTPATIENT)
Dept: ULTRASOUND IMAGING | Facility: CLINIC | Age: 32
End: 2022-03-10
Attending: EMERGENCY MEDICINE
Payer: COMMERCIAL

## 2022-03-10 ENCOUNTER — HOSPITAL ENCOUNTER (EMERGENCY)
Facility: CLINIC | Age: 32
Discharge: HOME | End: 2022-03-10
Attending: EMERGENCY MEDICINE | Admitting: EMERGENCY MEDICINE
Payer: COMMERCIAL

## 2022-03-10 VITALS
RESPIRATION RATE: 16 BRPM | HEART RATE: 79 BPM | SYSTOLIC BLOOD PRESSURE: 112 MMHG | DIASTOLIC BLOOD PRESSURE: 77 MMHG | OXYGEN SATURATION: 98 % | WEIGHT: 224.7 LBS | TEMPERATURE: 98.2 F

## 2022-03-10 DIAGNOSIS — L29.9 ITCHING: ICD-10-CM

## 2022-03-10 DIAGNOSIS — R10.11 ABDOMINAL PAIN, RIGHT UPPER QUADRANT: ICD-10-CM

## 2022-03-10 DIAGNOSIS — R10.9 RIGHT FLANK PAIN: ICD-10-CM

## 2022-03-10 DIAGNOSIS — N39.0 URINARY TRACT INFECTION WITHOUT HEMATURIA, SITE UNSPECIFIED: ICD-10-CM

## 2022-03-10 LAB
ALBUMIN SERPL-MCNC: 3.5 G/DL (ref 3.4–5)
ALBUMIN UR-MCNC: NEGATIVE MG/DL
ALP SERPL-CCNC: 98 U/L (ref 40–150)
ALT SERPL W P-5'-P-CCNC: 10 U/L (ref 0–50)
ANION GAP SERPL CALCULATED.3IONS-SCNC: 3 MMOL/L (ref 3–14)
APPEARANCE UR: CLEAR
AST SERPL W P-5'-P-CCNC: 16 U/L (ref 0–45)
BASOPHILS # BLD AUTO: 0.1 10E3/UL (ref 0–0.2)
BASOPHILS NFR BLD AUTO: 1 %
BILIRUB SERPL-MCNC: 0.2 MG/DL (ref 0.2–1.3)
BILIRUB UR QL STRIP: NEGATIVE
BUN SERPL-MCNC: 13 MG/DL (ref 7–30)
CALCIUM SERPL-MCNC: 8.8 MG/DL (ref 8.5–10.1)
CHLORIDE BLD-SCNC: 108 MMOL/L (ref 94–109)
CO2 SERPL-SCNC: 29 MMOL/L (ref 20–32)
COLOR UR AUTO: ABNORMAL
CREAT SERPL-MCNC: 0.7 MG/DL (ref 0.52–1.04)
EOSINOPHIL # BLD AUTO: 0.6 10E3/UL (ref 0–0.7)
EOSINOPHIL NFR BLD AUTO: 8 %
ERYTHROCYTE [DISTWIDTH] IN BLOOD BY AUTOMATED COUNT: 12.6 % (ref 10–15)
GFR SERPL CREATININE-BSD FRML MDRD: >90 ML/MIN/1.73M2
GLUCOSE BLD-MCNC: 90 MG/DL (ref 70–99)
GLUCOSE UR STRIP-MCNC: NEGATIVE MG/DL
HCG UR QL: NEGATIVE
HCT VFR BLD AUTO: 38.5 % (ref 35–47)
HGB BLD-MCNC: 12.3 G/DL (ref 11.7–15.7)
HGB UR QL STRIP: NEGATIVE
IMM GRANULOCYTES # BLD: 0 10E3/UL
IMM GRANULOCYTES NFR BLD: 0 %
INTERNAL QC OK POCT: NORMAL
KETONES UR STRIP-MCNC: NEGATIVE MG/DL
LEUKOCYTE ESTERASE UR QL STRIP: ABNORMAL
LIPASE SERPL-CCNC: 98 U/L (ref 73–393)
LYMPHOCYTES # BLD AUTO: 2 10E3/UL (ref 0.8–5.3)
LYMPHOCYTES NFR BLD AUTO: 28 %
MCH RBC QN AUTO: 29.4 PG (ref 26.5–33)
MCHC RBC AUTO-ENTMCNC: 31.9 G/DL (ref 31.5–36.5)
MCV RBC AUTO: 92 FL (ref 78–100)
MONOCYTES # BLD AUTO: 0.6 10E3/UL (ref 0–1.3)
MONOCYTES NFR BLD AUTO: 9 %
MUCOUS THREADS #/AREA URNS LPF: PRESENT /LPF
NEUTROPHILS # BLD AUTO: 3.9 10E3/UL (ref 1.6–8.3)
NEUTROPHILS NFR BLD AUTO: 54 %
NITRATE UR QL: NEGATIVE
NRBC # BLD AUTO: 0 10E3/UL
NRBC BLD AUTO-RTO: 0 /100
PH UR STRIP: 7 [PH] (ref 5–7)
PLATELET # BLD AUTO: 204 10E3/UL (ref 150–450)
POCT KIT EXPIRATION DATE: NORMAL
POCT KIT LOT NUMBER: NORMAL
POTASSIUM BLD-SCNC: 3.8 MMOL/L (ref 3.4–5.3)
PROT SERPL-MCNC: 7.6 G/DL (ref 6.8–8.8)
RBC # BLD AUTO: 4.19 10E6/UL (ref 3.8–5.2)
RBC URINE: 1 /HPF
SODIUM SERPL-SCNC: 140 MMOL/L (ref 133–144)
SP GR UR STRIP: 1.01 (ref 1–1.03)
SQUAMOUS EPITHELIAL: 5 /HPF
TRANSITIONAL EPI: 1 /HPF
UROBILINOGEN UR STRIP-MCNC: NORMAL MG/DL
WBC # BLD AUTO: 7.1 10E3/UL (ref 4–11)
WBC URINE: 35 /HPF

## 2022-03-10 PROCEDURE — 36415 COLL VENOUS BLD VENIPUNCTURE: CPT | Performed by: EMERGENCY MEDICINE

## 2022-03-10 PROCEDURE — 83690 ASSAY OF LIPASE: CPT | Performed by: EMERGENCY MEDICINE

## 2022-03-10 PROCEDURE — 80053 COMPREHEN METABOLIC PANEL: CPT | Performed by: EMERGENCY MEDICINE

## 2022-03-10 PROCEDURE — 85025 COMPLETE CBC W/AUTO DIFF WBC: CPT | Performed by: EMERGENCY MEDICINE

## 2022-03-10 PROCEDURE — 81001 URINALYSIS AUTO W/SCOPE: CPT | Performed by: EMERGENCY MEDICINE

## 2022-03-10 PROCEDURE — 76705 ECHO EXAM OF ABDOMEN: CPT

## 2022-03-10 PROCEDURE — 87086 URINE CULTURE/COLONY COUNT: CPT | Performed by: EMERGENCY MEDICINE

## 2022-03-10 PROCEDURE — 81025 URINE PREGNANCY TEST: CPT | Performed by: EMERGENCY MEDICINE

## 2022-03-10 RX ORDER — PRENATAL VIT/IRON FUM/FOLIC AC 27MG-0.8MG
1 TABLET ORAL DAILY
Qty: 90 TABLET | Refills: 3 | Status: SHIPPED | OUTPATIENT
Start: 2022-03-10 | End: 2022-03-10

## 2022-03-10 RX ORDER — ALBUTEROL SULFATE 90 UG/1
2 AEROSOL, METERED RESPIRATORY (INHALATION) EVERY 6 HOURS PRN
Qty: 18 G | Refills: 0 | Status: SHIPPED | OUTPATIENT
Start: 2022-03-10 | End: 2022-03-10

## 2022-03-10 RX ORDER — CEFDINIR 300 MG/1
300 CAPSULE ORAL 2 TIMES DAILY
Qty: 14 CAPSULE | Refills: 0 | Status: SHIPPED | OUTPATIENT
Start: 2022-03-10 | End: 2022-03-17

## 2022-03-10 NOTE — ED PROVIDER NOTES
"    [facility] EMERGENCY DEPARTMENT ([facility])    3/10/22    ED02  History     Chief Complaint   Patient presents with     Pruritis     \"Itchy all over.\" Has been present for a week.     Shoulder Pain     Right shouder pain to back. Pain under right breast. Pain has been present for over 2 weeks.      TITA Pastor is a 31 year old female who presents to the emergency department today with complaint of itchiness all over her that has been present for 1 to 2 weeks.  She also reports some right upper quadrant and right mid back discomfort which has been intermittent for the last couple weeks as well.  That seems to be worse after eating.  She states that she tried some loratadine the last couple of days for the itchiness and is not sure if it helped.  She states sometimes she thinks she sees a rash, but she is not sure.  She states the only new exposures that she can think of is some beet juice that she drank a couple weeks ago as well as some iron supplement, but she stopped both, and the itchiness has gotten worse.  No other new exposures.  No cough, sore throat, shortness of breath.  No fever, vomiting, diarrhea, dysuria.  She states that she was told previously that her bile acids were high, that she should have an ultrasound of her gallbladder, but never followed up with this.      This part of the medical record was transcribed by Lizzy Connelly Medical Scribe, from a dictation done by Marjorie Elizabeth MD.     Past Medical History  History reviewed. No pertinent past medical history.  History reviewed. No pertinent surgical history.  cefdinir (OMNICEF) 300 MG capsule  loratadine (CLARITIN) 10 MG tablet      No Known Allergies  Family History  No family history on file.  Social History   Social History     Tobacco Use     Smoking status: Never Smoker     Smokeless tobacco: Never Used   Substance Use Topics     Alcohol use: No     Drug use: No      Past medical history, past surgical history, " medications, allergies, family history, and social history were reviewed with the patient. No additional pertinent items.       Review of Systems  A complete review of systems was performed with pertinent positives and negatives noted in the HPI, and all other systems negative.    Physical Exam   BP: 111/79  Pulse: 79  Temp: 98.2  F (36.8  C)  Resp: 16  Weight: 101.9 kg (224 lb 11.2 oz)  SpO2: 99 %  Physical Exam  Constitutional:       General: She is not in acute distress.     Appearance: She is not diaphoretic.   HENT:      Head: Atraumatic.   Eyes:      General: No scleral icterus.  Cardiovascular:      Heart sounds: Normal heart sounds.   Pulmonary:      Effort: No respiratory distress.      Breath sounds: Normal breath sounds.   Abdominal:      Palpations: Abdomen is soft.      Tenderness: There is abdominal tenderness (mild RUQ tenderness without guarding, neg Patterson's sign).   Musculoskeletal:         General: Tenderness present.        Back:    Skin:     General: Skin is warm.      Findings: No rash.         ED Course      Procedures                     Results for orders placed or performed during the hospital encounter of 03/10/22   Abdomen US, limited (RUQ only)     Status: None    Narrative    EXAM: US ABDOMEN LIMITED  LOCATION: [facility]  DATE/TIME: 3/10/2022 1:40 AM    INDICATION: Right upper quadrant pain.  COMPARISON: Ultrasound 04/20/2018.  TECHNIQUE: Limited abdominal ultrasound.    FINDINGS:    GALLBLADDER: Normal. No gallstones, wall thickening, or pericholecystic fluid. Negative sonographic Patterson's sign.    BILE DUCTS: No biliary dilatation. The common duct measures 3 mm.    LIVER: Normal parenchyma with smooth contour. No focal mass.    RIGHT KIDNEY: No hydronephrosis.    PANCREAS: The visualized portions are normal.    No ascites.      Impression    IMPRESSION:  Normal limited abdominal ultrasound.       Comprehensive metabolic panel     Status:  Normal   Result Value Ref Range    Sodium 140 133 - 144 mmol/L    Potassium 3.8 3.4 - 5.3 mmol/L    Chloride 108 94 - 109 mmol/L    Carbon Dioxide (CO2) 29 20 - 32 mmol/L    Anion Gap 3 3 - 14 mmol/L    Urea Nitrogen 13 7 - 30 mg/dL    Creatinine 0.70 0.52 - 1.04 mg/dL    Calcium 8.8 8.5 - 10.1 mg/dL    Glucose 90 70 - 99 mg/dL    Alkaline Phosphatase 98 40 - 150 U/L    AST 16 0 - 45 U/L    ALT 10 0 - 50 U/L    Protein Total 7.6 6.8 - 8.8 g/dL    Albumin 3.5 3.4 - 5.0 g/dL    Bilirubin Total 0.2 0.2 - 1.3 mg/dL    GFR Estimate >90 >60 mL/min/1.73m2   Lipase     Status: Normal   Result Value Ref Range    Lipase 98 73 - 393 U/L   UA with Microscopic     Status: Abnormal   Result Value Ref Range    Color Urine Light Yellow Colorless, Straw, Light Yellow, Yellow    Appearance Urine Clear Clear    Glucose Urine Negative Negative mg/dL    Bilirubin Urine Negative Negative    Ketones Urine Negative Negative mg/dL    Specific Gravity Urine 1.013 1.003 - 1.035    Blood Urine Negative Negative    pH Urine 7.0 5.0 - 7.0    Protein Albumin Urine Negative Negative mg/dL    Urobilinogen Urine Normal Normal, 2.0 mg/dL    Nitrite Urine Negative Negative    Leukocyte Esterase Urine Large (A) Negative    Mucus Urine Present (A) None Seen /LPF    RBC Urine 1 <=2 /HPF    WBC Urine 35 (H) <=5 /HPF    Squamous Epithelials Urine 5 (H) <=1 /HPF    Transitional Epithelials Urine 1 <=1 /HPF   CBC with platelets and differential     Status: None   Result Value Ref Range    WBC Count 7.1 4.0 - 11.0 10e3/uL    RBC Count 4.19 3.80 - 5.20 10e6/uL    Hemoglobin 12.3 11.7 - 15.7 g/dL    Hematocrit 38.5 35.0 - 47.0 %    MCV 92 78 - 100 fL    MCH 29.4 26.5 - 33.0 pg    MCHC 31.9 31.5 - 36.5 g/dL    RDW 12.6 10.0 - 15.0 %    Platelet Count 204 150 - 450 10e3/uL    % Neutrophils 54 %    % Lymphocytes 28 %    % Monocytes 9 %    % Eosinophils 8 %    % Basophils 1 %    % Immature Granulocytes 0 %    NRBCs per 100 WBC 0 <1 /100    Absolute Neutrophils 3.9  1.6 - 8.3 10e3/uL    Absolute Lymphocytes 2.0 0.8 - 5.3 10e3/uL    Absolute Monocytes 0.6 0.0 - 1.3 10e3/uL    Absolute Eosinophils 0.6 0.0 - 0.7 10e3/uL    Absolute Basophils 0.1 0.0 - 0.2 10e3/uL    Absolute Immature Granulocytes 0.0 <=0.4 10e3/uL    Absolute NRBCs 0.0 10e3/uL   hCG qual urine POCT     Status: Normal   Result Value Ref Range    HCG Qual Urine Negative Negative    Internal QC Check POCT Valid Valid    POCT Kit Lot Number [id number]     POCT Kit Expiration Date 4/30/2023    CBC with platelets differential     Status: None    Narrative    The following orders were created for panel order CBC with platelets differential.  Procedure                               Abnormality         Status                     ---------                               -----------         ------                     CBC with platelets and d...[[id number]]                      Final result                 Please view results for these tests on the individual orders.     Medications - No data to display     Assessments & Plan (with Medical Decision Making)   Given the pruritus and right upper quadrant discomfort, I was concerned for possibility of biliary related pruritus.  LFTs are normal though, right upper quadrant ultrasound is normal as well.  UA is questionable for infection, though does look contaminated with 5 squamous cells.  I will culture this, though given her pain in the right flank, we will go ahead and treat her with a course of antibiotics.  I did consider giving her some Benadryl for itching, though she is currently nursing and this is listed as contraindication.  No rash is seen.  Abdominal exam really overall is benign and they do not think she benefit from CT scan.  No chest pain or shortness of breath.  She did not complain to me of any shoulder pain, just the right flank pain and right upper quadrant pain.  I do think she needs to follow-up with primary care, strongly encourage this over the next few days.   She is encouraged to return to the ER with any new or worsening symptoms, any other concerns.  She verbalizes understanding and is agreeable to the plan.    Dictation Disclaimer: Some of this Note has been completed with voice-recognition dictation software. Although errors are generally corrected real-time, there is the potential for a rare error to be present in the completed chart.      I have reviewed the nursing notes. I have reviewed the findings, diagnosis, plan and need for follow up with the patient.    New Prescriptions    CEFDINIR (OMNICEF) 300 MG CAPSULE    Take 1 capsule (300 mg) by mouth 2 times daily for 7 days       Final diagnoses:   Itching   Right flank pain   Abdominal pain, right upper quadrant   Urinary tract infection without hematuria, site unspecified       --  Marjorie Elizabeth MD  [facility] EMERGENCY DEPARTMENT  3/9/2022     Marjorie Elizabeth MD  03/10/22 0243

## 2022-03-10 NOTE — DISCHARGE INSTRUCTIONS
Please make an appointment to follow up with Your Primary Care Provider in 1-5 days. Return with any new or worsening symptoms or any other concerns.

## 2022-03-11 LAB — BACTERIA UR CULT: NORMAL

## 2023-11-11 ENCOUNTER — APPOINTMENT (OUTPATIENT)
Dept: GENERAL RADIOLOGY | Facility: CLINIC | Age: 33
End: 2023-11-11
Attending: EMERGENCY MEDICINE
Payer: COMMERCIAL

## 2023-11-11 ENCOUNTER — HOSPITAL ENCOUNTER (EMERGENCY)
Facility: CLINIC | Age: 33
Discharge: HOME | End: 2023-11-12
Attending: EMERGENCY MEDICINE | Admitting: EMERGENCY MEDICINE
Payer: COMMERCIAL

## 2023-11-11 DIAGNOSIS — M19.90 ARTHRITIS: ICD-10-CM

## 2023-11-11 DIAGNOSIS — R21 RASH: ICD-10-CM

## 2023-11-11 PROCEDURE — 93005 ELECTROCARDIOGRAM TRACING: CPT

## 2023-11-11 PROCEDURE — 93010 ELECTROCARDIOGRAM REPORT: CPT | Performed by: EMERGENCY MEDICINE

## 2023-11-11 PROCEDURE — 96360 HYDRATION IV INFUSION INIT: CPT

## 2023-11-11 PROCEDURE — 71046 X-RAY EXAM CHEST 2 VIEWS: CPT

## 2023-11-11 PROCEDURE — 99285 EMERGENCY DEPT VISIT HI MDM: CPT | Mod: 25

## 2023-11-11 PROCEDURE — 99285 EMERGENCY DEPT VISIT HI MDM: CPT | Mod: 25 | Performed by: EMERGENCY MEDICINE

## 2023-11-11 PROCEDURE — 96361 HYDRATE IV INFUSION ADD-ON: CPT

## 2023-11-11 RX ORDER — ACETAMINOPHEN 500 MG
500-1000 TABLET ORAL EVERY 6 HOURS PRN
COMMUNITY

## 2023-11-11 RX ORDER — NAPROXEN 500 MG/1
500 TABLET ORAL ONCE
Status: COMPLETED | OUTPATIENT
Start: 2023-11-11 | End: 2023-11-12

## 2023-11-11 RX ORDER — LORATADINE 10 MG/1
10 TABLET ORAL DAILY
Status: DISCONTINUED | OUTPATIENT
Start: 2023-11-12 | End: 2023-11-12 | Stop reason: HOSPADM

## 2023-11-11 RX ORDER — NYSTATIN AND TRIAMCINOLONE ACETONIDE 100000; 1 [USP'U]/G; MG/G
CREAM TOPICAL ONCE
Qty: 15 G | Refills: 0 | Status: COMPLETED | OUTPATIENT
Start: 2023-11-12 | End: 2023-11-12

## 2023-11-12 VITALS
WEIGHT: 212 LBS | RESPIRATION RATE: 14 BRPM | HEIGHT: 66 IN | SYSTOLIC BLOOD PRESSURE: 104 MMHG | HEART RATE: 73 BPM | BODY MASS INDEX: 34.07 KG/M2 | DIASTOLIC BLOOD PRESSURE: 55 MMHG | OXYGEN SATURATION: 100 % | TEMPERATURE: 97.8 F

## 2023-11-12 LAB
ALBUMIN SERPL BCG-MCNC: 4.3 G/DL (ref 3.5–5.2)
ALBUMIN UR-MCNC: NEGATIVE MG/DL
ALP SERPL-CCNC: 83 U/L (ref 35–104)
ALT SERPL W P-5'-P-CCNC: 7 U/L (ref 0–50)
AMPHETAMINES UR QL SCN: NORMAL
ANION GAP SERPL CALCULATED.3IONS-SCNC: 7 MMOL/L (ref 7–15)
APPEARANCE UR: CLEAR
AST SERPL W P-5'-P-CCNC: 19 U/L (ref 0–45)
ATRIAL RATE - MUSE: 85 BPM
BARBITURATES UR QL SCN: NORMAL
BASOPHILS # BLD AUTO: 0.1 10E3/UL (ref 0–0.2)
BASOPHILS NFR BLD AUTO: 1 %
BENZODIAZ UR QL SCN: NORMAL
BILIRUB SERPL-MCNC: 0.3 MG/DL
BILIRUB UR QL STRIP: NEGATIVE
BUN SERPL-MCNC: 9.2 MG/DL (ref 6–20)
BZE UR QL SCN: NORMAL
C PNEUM DNA SPEC QL NAA+PROBE: NOT DETECTED
CALCIUM SERPL-MCNC: 9.4 MG/DL (ref 8.6–10)
CANNABINOIDS UR QL SCN: NORMAL
CHLORIDE SERPL-SCNC: 103 MMOL/L (ref 98–107)
COLOR UR AUTO: ABNORMAL
CREAT SERPL-MCNC: 0.52 MG/DL (ref 0.51–0.95)
CRP SERPL-MCNC: 4.24 MG/L
DEPRECATED HCO3 PLAS-SCNC: 27 MMOL/L (ref 22–29)
DIASTOLIC BLOOD PRESSURE - MUSE: NORMAL MMHG
EGFRCR SERPLBLD CKD-EPI 2021: >90 ML/MIN/1.73M2
EOSINOPHIL # BLD AUTO: 0.4 10E3/UL (ref 0–0.7)
EOSINOPHIL NFR BLD AUTO: 4 %
ERYTHROCYTE [DISTWIDTH] IN BLOOD BY AUTOMATED COUNT: 12.2 % (ref 10–15)
ERYTHROCYTE [SEDIMENTATION RATE] IN BLOOD BY WESTERGREN METHOD: 22 MM/HR (ref 0–20)
FENTANYL UR QL: NORMAL
FLUAV H1 2009 PAND RNA SPEC QL NAA+PROBE: NOT DETECTED
FLUAV H1 RNA SPEC QL NAA+PROBE: NOT DETECTED
FLUAV H3 RNA SPEC QL NAA+PROBE: NOT DETECTED
FLUAV RNA SPEC QL NAA+PROBE: NOT DETECTED
FLUBV RNA SPEC QL NAA+PROBE: NOT DETECTED
GLUCOSE SERPL-MCNC: 92 MG/DL (ref 70–99)
GLUCOSE UR STRIP-MCNC: NEGATIVE MG/DL
HADV DNA SPEC QL NAA+PROBE: NOT DETECTED
HCG SERPL QL: NEGATIVE
HCOV PNL SPEC NAA+PROBE: NOT DETECTED
HCT VFR BLD AUTO: 40.1 % (ref 35–47)
HGB BLD-MCNC: 12.7 G/DL (ref 11.7–15.7)
HGB UR QL STRIP: NEGATIVE
HMPV RNA SPEC QL NAA+PROBE: NOT DETECTED
HPIV1 RNA SPEC QL NAA+PROBE: NOT DETECTED
HPIV2 RNA SPEC QL NAA+PROBE: NOT DETECTED
HPIV3 RNA SPEC QL NAA+PROBE: NOT DETECTED
HPIV4 RNA SPEC QL NAA+PROBE: NOT DETECTED
IMM GRANULOCYTES # BLD: 0 10E3/UL
IMM GRANULOCYTES NFR BLD: 0 %
INTERPRETATION ECG - MUSE: NORMAL
KETONES UR STRIP-MCNC: NEGATIVE MG/DL
LACTATE SERPL-SCNC: 0.9 MMOL/L (ref 0.7–2)
LEUKOCYTE ESTERASE UR QL STRIP: NEGATIVE
LIPASE SERPL-CCNC: 21 U/L (ref 13–60)
LYMPHOCYTES # BLD AUTO: 2.4 10E3/UL (ref 0.8–5.3)
LYMPHOCYTES NFR BLD AUTO: 29 %
M PNEUMO DNA SPEC QL NAA+PROBE: NOT DETECTED
MAGNESIUM SERPL-MCNC: 2.2 MG/DL (ref 1.7–2.3)
MCH RBC QN AUTO: 28.5 PG (ref 26.5–33)
MCHC RBC AUTO-ENTMCNC: 31.7 G/DL (ref 31.5–36.5)
MCV RBC AUTO: 90 FL (ref 78–100)
MONOCYTES # BLD AUTO: 0.6 10E3/UL (ref 0–1.3)
MONOCYTES NFR BLD AUTO: 7 %
MUCOUS THREADS #/AREA URNS LPF: PRESENT /LPF
NEUTROPHILS # BLD AUTO: 5 10E3/UL (ref 1.6–8.3)
NEUTROPHILS NFR BLD AUTO: 59 %
NITRATE UR QL: NEGATIVE
NRBC # BLD AUTO: 0 10E3/UL
NRBC BLD AUTO-RTO: 0 /100
OPIATES UR QL SCN: NORMAL
P AXIS - MUSE: 68 DEGREES
PCP QUAL URINE (ROCHE): NORMAL
PH UR STRIP: 6 [PH] (ref 5–7)
PLATELET # BLD AUTO: 206 10E3/UL (ref 150–450)
POTASSIUM SERPL-SCNC: 3.7 MMOL/L (ref 3.4–5.3)
PR INTERVAL - MUSE: 166 MS
PROCALCITONIN SERPL IA-MCNC: <0.02 NG/ML
PROT SERPL-MCNC: 8 G/DL (ref 6.4–8.3)
QRS DURATION - MUSE: 90 MS
QT - MUSE: 400 MS
QTC - MUSE: 476 MS
R AXIS - MUSE: 31 DEGREES
RBC # BLD AUTO: 4.46 10E6/UL (ref 3.8–5.2)
RBC URINE: 1 /HPF
RSV RNA SPEC QL NAA+PROBE: NOT DETECTED
RSV RNA SPEC QL NAA+PROBE: NOT DETECTED
RV+EV RNA SPEC QL NAA+PROBE: NOT DETECTED
SODIUM SERPL-SCNC: 137 MMOL/L (ref 135–145)
SP GR UR STRIP: 1 (ref 1–1.03)
SQUAMOUS EPITHELIAL: 4 /HPF
SYSTOLIC BLOOD PRESSURE - MUSE: NORMAL MMHG
T AXIS - MUSE: 48 DEGREES
TROPONIN T SERPL HS-MCNC: <6 NG/L
TSH SERPL DL<=0.005 MIU/L-ACNC: 1.94 UIU/ML (ref 0.3–4.2)
UROBILINOGEN UR STRIP-MCNC: NORMAL MG/DL
VENTRICULAR RATE- MUSE: 85 BPM
WBC # BLD AUTO: 8.4 10E3/UL (ref 4–11)
WBC URINE: 1 /HPF

## 2023-11-12 PROCEDURE — 84443 ASSAY THYROID STIM HORMONE: CPT | Performed by: EMERGENCY MEDICINE

## 2023-11-12 PROCEDURE — 86140 C-REACTIVE PROTEIN: CPT | Performed by: EMERGENCY MEDICINE

## 2023-11-12 PROCEDURE — 86038 ANTINUCLEAR ANTIBODIES: CPT | Performed by: EMERGENCY MEDICINE

## 2023-11-12 PROCEDURE — 83605 ASSAY OF LACTIC ACID: CPT | Performed by: EMERGENCY MEDICINE

## 2023-11-12 PROCEDURE — 87581 M.PNEUMON DNA AMP PROBE: CPT | Performed by: EMERGENCY MEDICINE

## 2023-11-12 PROCEDURE — 83735 ASSAY OF MAGNESIUM: CPT | Performed by: EMERGENCY MEDICINE

## 2023-11-12 PROCEDURE — 250N000013 HC RX MED GY IP 250 OP 250 PS 637: Performed by: EMERGENCY MEDICINE

## 2023-11-12 PROCEDURE — 84703 CHORIONIC GONADOTROPIN ASSAY: CPT | Performed by: EMERGENCY MEDICINE

## 2023-11-12 PROCEDURE — 80053 COMPREHEN METABOLIC PANEL: CPT | Performed by: EMERGENCY MEDICINE

## 2023-11-12 PROCEDURE — 85025 COMPLETE CBC W/AUTO DIFF WBC: CPT | Performed by: EMERGENCY MEDICINE

## 2023-11-12 PROCEDURE — 85652 RBC SED RATE AUTOMATED: CPT | Performed by: EMERGENCY MEDICINE

## 2023-11-12 PROCEDURE — 84484 ASSAY OF TROPONIN QUANT: CPT | Performed by: EMERGENCY MEDICINE

## 2023-11-12 PROCEDURE — 83690 ASSAY OF LIPASE: CPT | Performed by: EMERGENCY MEDICINE

## 2023-11-12 PROCEDURE — 80307 DRUG TEST PRSMV CHEM ANLYZR: CPT | Performed by: EMERGENCY MEDICINE

## 2023-11-12 PROCEDURE — 81001 URINALYSIS AUTO W/SCOPE: CPT | Performed by: EMERGENCY MEDICINE

## 2023-11-12 PROCEDURE — 258N000003 HC RX IP 258 OP 636: Performed by: EMERGENCY MEDICINE

## 2023-11-12 PROCEDURE — 84145 PROCALCITONIN (PCT): CPT | Performed by: EMERGENCY MEDICINE

## 2023-11-12 PROCEDURE — 36415 COLL VENOUS BLD VENIPUNCTURE: CPT | Performed by: EMERGENCY MEDICINE

## 2023-11-12 RX ORDER — OXYCODONE HYDROCHLORIDE 5 MG/1
5 TABLET ORAL ONCE
Status: COMPLETED | OUTPATIENT
Start: 2023-11-12 | End: 2023-11-12

## 2023-11-12 RX ADMIN — NYSTATIN, TRIAMCINOLONE ACETONIDE: 100000; 1 CREAM TOPICAL at 01:51

## 2023-11-12 RX ADMIN — OXYCODONE HYDROCHLORIDE 5 MG: 5 TABLET ORAL at 01:21

## 2023-11-12 RX ADMIN — NAPROXEN 500 MG: 500 TABLET ORAL at 01:21

## 2023-11-12 RX ADMIN — SODIUM CHLORIDE, POTASSIUM CHLORIDE, SODIUM LACTATE AND CALCIUM CHLORIDE 1000 ML: 600; 310; 30; 20 INJECTION, SOLUTION INTRAVENOUS at 01:33

## 2023-11-12 ASSESSMENT — ACTIVITIES OF DAILY LIVING (ADL)
ADLS_ACUITY_SCORE: 35
ADLS_ACUITY_SCORE: 35

## 2023-11-12 NOTE — ED PROVIDER NOTES
"ED Provider Note  [facility]      History     Chief Complaint   Patient presents with    Generalized Body Aches     The history is provided by the patient and medical records.     Gregorio Pastor is a 33 year old female postpartum approximately 7 months now presents with approximately 1 year of \"bone pain all over her body\" which is waxing and waning and has developed weeks to months. A itchy rash on the extensor surfaces of wrists and hands a little on the palms, and itchiness all over her body and scalp. Patient reports waxing and waning bone and joint pains, as well as occasional chills. Denies other infectious symptoms, fevers     This part of the medical record was transcribed by Shayne Cade, Medical Scribe, from a dictation done by Alexx Xiao MD.      Past Medical History  Past Medical History:   Diagnosis Date    Helicobacter pylori (H. pylori) infection      History reviewed. No pertinent surgical history.  acetaminophen (TYLENOL) 500 MG tablet  loratadine (CLARITIN) 10 MG tablet      No Known Allergies  Family History  History reviewed. No pertinent family history.  Social History   Social History     Tobacco Use    Smoking status: Never    Smokeless tobacco: Never   Substance Use Topics    Alcohol use: No    Drug use: No         A medically appropriate review of systems was performed with pertinent positives and negatives noted in the HPI, and all other systems negative.    Physical Exam   BP: 96/59  Pulse: 82  Temp: 97.4  F (36.3  C)  Resp: 18  SpO2: 97 %  Physical Exam  Overall nontoxic appearing. Breathing comfortably. Atraumatic and mucous membranes normal, with slightly darkened spots on the lateral surfaces of the tongue.  Extensor surfaces of forearms and hands dry excoriated, plaques of dry skin on palms. No papules or pustules. No underlying warmth or erythema. Legs and lower extremities without skin changes. Eyes and conjunctiva normal.    ED Course, " Procedures, & Data      Procedures            EKG Interpretation:      Interpreted by Alexx Xiao MD  Time reviewed: 2350  Symptoms at time of EKG: Bone pain  Rhythm: normal sinus   Rate: Normal  Axis: Normal  ST Segments/ T Waves: No ST-T wave changes    Clinical Impression: Normal sinus without acute ischemia                       No results found for any visits on 11/11/23.  Medications   lactated ringers BOLUS 1,000 mL (has no administration in time range)   naproxen (NAPROSYN) tablet 500 mg (has no administration in time range)   loratadine (CLARITIN) tablet 10 mg (has no administration in time range)   nystatin-triamcinolone (MYCOLOG II) cream (has no administration in time range)     Labs Ordered and Resulted from Time of ED Arrival to Time of ED Departure - No data to display  XR Chest 2 Views    (Results Pending)          Critical care was not performed.     Medical Decision Making  The patient's presentation was of high complexity (a chronic illness severe exacerbation, progression, or side effect of treatment).    The patient's evaluation involved:  ordering and/or review of 3+ test(s) in this encounter (see separate area of note for details)  independent interpretation of testing performed by another health professional (chest x-ray)    The patient's management necessitated high risk (a decision regarding hospitalization).    Assessment & Plan    Waxing and waning bone pains with rash concerning for a rheumatology related etiology. Will screen with DAVIDE, CRP, and ESR. Will also screen for common infectious symptoms such as UTI, pneumonia, or respiratory viral infection. Will screen for pregnancy.     Will treat with IV fluids, loratadine for itchiness, naproxen for bone pain, and will reassess for symptoms.  If pain continues or acute medical findings will entertain hospital admission .      if no emergent causes, will likely discharge with referral to primary care and rheumatologist    I have  reviewed the nursing notes. I have reviewed the findings, diagnosis, plan and need for follow up with the patient.    New Prescriptions    No medications on file       Final diagnoses:   Rash   Arthritis       Alexx Xiao MD.  [facility] EMERGENCY DEPARTMENT  11/11/2023     Alexx Xiao MD  11/12/23 0056

## 2023-11-12 NOTE — DISCHARGE INSTRUCTIONS
Please follow-up with the primary care doctor and rheumatologist --referrals have been placed on your behalf  For pain, please take 975-1000mg acetaminophen (tylenol) every 8 hours -- do not take more than 3000mg in a 24 hour period.    You can also take 600mg ibuprofen (motrin/advil) every 6 hours for pain

## 2023-11-12 NOTE — ED TRIAGE NOTES
Pt here is presenting with pain (muscle and bone pain) all over her body which began 1-2 years ago but getting worse. Pt has been seen by an MD before for the body pain and unsure what she was told she has. Pt states itchy burning rash a few weeks all over  her body with swelling. Pt also reports feeling cold chills and pinching in her muscles,    + nausea without vomiting or diarrhea. Denies sick contacts with similar sx.      Triage Assessment (Adult)       Row Name 11/11/23 9261          Triage Assessment    Airway WDL WDL        Respiratory WDL    Respiratory WDL WDL        Skin Circulation/Temperature WDL    Skin Circulation/Temperature WDL WDL        Cardiac WDL    Cardiac WDL WDL

## 2023-11-14 LAB
ANA PAT SER IF-IMP: ABNORMAL
ANA SER QL IF: POSITIVE
ANA TITR SER IF: ABNORMAL {TITER}

## 2023-11-16 ENCOUNTER — OFFICE VISIT (OUTPATIENT)
Dept: FAMILY MEDICINE | Facility: CLINIC | Age: 33
End: 2023-11-16
Payer: COMMERCIAL

## 2023-11-16 ENCOUNTER — APPOINTMENT (OUTPATIENT)
Dept: LAB | Facility: CLINIC | Age: 33
End: 2023-11-16
Payer: COMMERCIAL

## 2023-11-16 VITALS
RESPIRATION RATE: 13 BRPM | BODY MASS INDEX: 36.96 KG/M2 | TEMPERATURE: 98.4 F | HEART RATE: 83 BPM | WEIGHT: 216.5 LBS | SYSTOLIC BLOOD PRESSURE: 109 MMHG | OXYGEN SATURATION: 99 % | DIASTOLIC BLOOD PRESSURE: 69 MMHG | HEIGHT: 64 IN

## 2023-11-16 DIAGNOSIS — Z11.4 SCREENING FOR HIV (HUMAN IMMUNODEFICIENCY VIRUS): Primary | ICD-10-CM

## 2023-11-16 DIAGNOSIS — Z12.4 CERVICAL CANCER SCREENING: ICD-10-CM

## 2023-11-16 DIAGNOSIS — Z11.59 NEED FOR HEPATITIS C SCREENING TEST: ICD-10-CM

## 2023-11-16 DIAGNOSIS — R21 RASH: ICD-10-CM

## 2023-11-16 PROCEDURE — 99202 OFFICE O/P NEW SF 15 MIN: CPT

## 2023-11-16 ASSESSMENT — PAIN SCALES - GENERAL: PAINLEVEL: MODERATE PAIN (5)

## 2023-11-16 NOTE — PROGRESS NOTES
Assessment & Plan     Rash  Unclear cause of patient's rash, muscle and joint pain, abdominal pain.  I am wondering whether patient may have some underlying autoimmune disorder given that her DAVIDE was positive with positive ESR.  She is planning on following up with rheumatology.  Patient is very worried about lupus, and we had discussed the option of doing an antidouble-stranded DNA antibody test today, which she expressed preference for waiting to see rheumatology.  Offered patient dermatology E consult given the presence of her rash, which she had declined today.  She reports she has tested positive for H. pylori more recently, but declines treatment with antibiotics and is trying to manage with GERD therapy, although I did offer her retesting today.   MED REC REQUIRED  Post Medication Reconciliation Status: discharge medications reconciled, continue medications without change      Simeon Soriano NP  [facility]    Kevin Perkins is a 33 year old, presenting for the following health issues:  ER F/U (Derm problem, joint and muscle pain)      11/16/2023    11:19 AM   Additional Questions   Roomed by Mellisa Hilton   Patient has had an itchy rash, darkened to silver plaques.  Most recent rash flared with cramps, muscle pain, then had face being red all over. This happened one month before ER visit in mid October.    She tried eczema cream without relief. Tried vaseline, oatmeal cream, essential oil, black oil, black seed oil, coconut oil, olive oil.     Was taking claritin for about 1 year, and if she stops, she feels dry, cramping, gut issue. When eating food, she gets pain. Can't eat junk foot or tomatoes due to tomato, black tea, pinneaple, black tea. Has been going on for years.  Patient reports she is still positive for H. pylori  Rash is on the hands and chest.  Diagnosed with chronic urticaria and cholinergic urticaria.    Pitting edema for years in bilateral legs.    In morning,  "patient has burning eyes, red on face. Pain on the right hip, top of front of chest over the past year. She does massage, acupuncture.  HPI   ED/UC Followup:    Facility: [facility] ED  Date of visit: 11/11/23  Reason for visit: rash and body aches  Current Status: Improving        Review of Systems   Constitutional, HEENT, cardiovascular, pulmonary, gi and gu systems are negative, except as otherwise noted.      Objective    /69   Pulse 83   Temp 98.4  F (36.9  C) (Temporal)   Resp 13   Ht 1.625 m (5' 3.98\")   Wt 98.2 kg (216 lb 8 oz)   LMP 11/15/2023 (Exact Date)   SpO2 99%   Breastfeeding No   BMI 37.19 kg/m    Body mass index is 37.19 kg/m .  Physical Exam   GENERAL: healthy, alert and no distress  EYES: Eyes grossly normal to inspection, PERRL and conjunctivae and sclerae normal  MS: no gross musculoskeletal defects noted, no edema  SKIN: plaques noted on bilateral hands, silvery outline with darkened center part of lesions. Darkened center lesions noted on patient's chest.  Weiner test shows light fluorescence around the edge of the lesions with darkened centers.     Media Information    Document Information    Other: Photograph      11/16/2023 11:57 AM   Attached To:   Office Visit on 11/16/23 with Simeon Soriano NP   Source Information    Simeon Soriano NP [facility]      Media Information    Document Information    Other: Photograph      11/16/2023 11:57 AM   Attached To:   Office Visit on 11/16/23 with Simeon Soriano NP   Source Information    Simeon Soriano NP [facility]      Media Information    Document Information    Other: Photograph      11/16/2023 12:06 PM   Attached To:   Office Visit on 11/16/23 with Simeon Soriano NP   Source Information    Simeon Soriano NP [facility]      Media Information    Document Information    Other: Photograph      11/16/2023 12:12 PM   Attached To:   Office Visit on 11/16/23 with Simeon Soriano NP   Source Information    Christie, " [facility]   Primary Care     NEURO: Normal strength and tone, mentation intact and speech normal

## 2023-12-14 ENCOUNTER — HOSPITAL ENCOUNTER (EMERGENCY)
Facility: CLINIC | Age: 33
Discharge: HOME | End: 2023-12-14
Attending: STUDENT IN AN ORGANIZED HEALTH CARE EDUCATION/TRAINING PROGRAM | Admitting: STUDENT IN AN ORGANIZED HEALTH CARE EDUCATION/TRAINING PROGRAM
Payer: COMMERCIAL

## 2023-12-14 ENCOUNTER — TELEPHONE (OUTPATIENT)
Dept: DERMATOLOGY | Facility: CLINIC | Age: 33
End: 2023-12-14
Payer: COMMERCIAL

## 2023-12-14 VITALS
BODY MASS INDEX: 34.99 KG/M2 | WEIGHT: 210 LBS | TEMPERATURE: 98.1 F | DIASTOLIC BLOOD PRESSURE: 62 MMHG | HEART RATE: 76 BPM | RESPIRATION RATE: 16 BRPM | OXYGEN SATURATION: 100 % | SYSTOLIC BLOOD PRESSURE: 93 MMHG | HEIGHT: 65 IN

## 2023-12-14 DIAGNOSIS — L50.9 URTICARIA: ICD-10-CM

## 2023-12-14 DIAGNOSIS — H57.89 PERIORBITAL SWELLING: ICD-10-CM

## 2023-12-14 PROCEDURE — 99283 EMERGENCY DEPT VISIT LOW MDM: CPT | Performed by: STUDENT IN AN ORGANIZED HEALTH CARE EDUCATION/TRAINING PROGRAM

## 2023-12-14 PROCEDURE — 99284 EMERGENCY DEPT VISIT MOD MDM: CPT | Performed by: STUDENT IN AN ORGANIZED HEALTH CARE EDUCATION/TRAINING PROGRAM

## 2023-12-14 PROCEDURE — 250N000013 HC RX MED GY IP 250 OP 250 PS 637: Performed by: STUDENT IN AN ORGANIZED HEALTH CARE EDUCATION/TRAINING PROGRAM

## 2023-12-14 RX ORDER — PREDNISONE 20 MG/1
TABLET ORAL
Qty: 10 TABLET | Refills: 0 | Status: SHIPPED | OUTPATIENT
Start: 2023-12-14

## 2023-12-14 RX ORDER — LORATADINE 10 MG/1
10 TABLET ORAL EVERY 8 HOURS PRN
Qty: 15 TABLET | Refills: 0 | Status: SHIPPED | OUTPATIENT
Start: 2023-12-14 | End: 2023-12-19

## 2023-12-14 RX ORDER — LORATADINE 10 MG/1
10 TABLET ORAL DAILY
Status: DISCONTINUED | OUTPATIENT
Start: 2023-12-14 | End: 2023-12-14 | Stop reason: HOSPADM

## 2023-12-14 RX ADMIN — LORATADINE 10 MG: 10 TABLET ORAL at 08:09

## 2023-12-14 NOTE — ED TRIAGE NOTES
States that she has had this for the past 2 weeks , worse this am with increased burning and swelling to face, no resp distress at this time     Triage Assessment (Adult)       Row Name 12/14/23 0726          Triage Assessment    Airway WDL WDL        Respiratory WDL    Respiratory WDL WDL        Skin Circulation/Temperature WDL    Skin Circulation/Temperature WDL WDL        Cardiac WDL    Cardiac WDL WDL     Cardiac Rhythm NSR        Peripheral/Neurovascular WDL    Peripheral Neurovascular WDL WDL        Cognitive/Neuro/Behavioral WDL    Cognitive/Neuro/Behavioral WDL WDL

## 2023-12-14 NOTE — TELEPHONE ENCOUNTER
[facility] Call Center    Phone Message    May a detailed message be left on voicemail: yes     Reason for Call: Appointment Intake    Referring Provider Name: Kina Ojeda MD @ [facility]  Diagnosis and/or Symptoms: Urticaria [L50.9], Periorbital swelling [H57.89]   Priority: 1-2 Weeks    Hospital follow up, routing to clinic per protocols.      Action Taken: Message routed to:  [facility] D[facility] Adult    Travel Screening: Not Applicable

## 2023-12-14 NOTE — ED PROVIDER NOTES
"ED Provider Note  [facility]      History     Chief Complaint   Patient presents with    Facial Swelling     Increased swelling to face, worse this am     HPI  Gregorio Pastor is a 33 year old female with a past medical history of dermatographia, currently awaiting workup by rheumatology for possible rheumatological rash and bodyaches presents to the emergency department due to swelling and itchiness around her eyes, tearing from her right eye, and pain throughout her head and on the right side of her face.    Notes that she has been having issues with body pain on and off for a number of months, and was evaluated here on 11/11 by Dr. Xiao who obtained labs at that time that were all reassuring.  She did have a positive DAVIDE, and is awaiting rheumatology evaluation.    Denies any difficulty breathing, nausea, vomiting.  No numbness, weakness or tingling.  No vision changes    Past Medical History  Past Medical History:   Diagnosis Date    Helicobacter pylori (H. pylori) infection      History reviewed. No pertinent surgical history.  acetaminophen (TYLENOL) 500 MG tablet  loratadine (CLARITIN) 10 MG tablet  loratadine (CLARITIN) 10 MG tablet  predniSONE (DELTASONE) 20 MG tablet      No Known Allergies  Family History  History reviewed. No pertinent family history.  Social History   Social History     Tobacco Use    Smoking status: Never    Smokeless tobacco: Never   Vaping Use    Vaping Use: Never used   Substance Use Topics    Alcohol use: No    Drug use: No         A medically appropriate review of systems was performed with pertinent positives and negatives noted in the HPI, and all other systems negative.    Physical Exam   BP: 120/79  Pulse: 72  Temp: 98  F (36.7  C)  Resp: 16  Height: 165.1 cm (5' 5\")  Weight: 95.3 kg (210 lb)  SpO2: 100 %  Physical Exam  GEN: Well appearing, non toxic, cooperative  HEENT: normocephalic and atraumatic, PERRLA, EOMI, mild periorbital swelling and " slight warmth in the area without any definitive erythema, lips appear full, but no evidence of any rash on them, tongue is within normal limits, no rashes.  In the mucosa  CV: well-perfused, normal skin color for ethnicity, regular rate and rhythm  PULM: breathing comfortably, in no respiratory distress  ABD: nondistended  EXT: Full range of motion.  No edema.  NEURO: awake, conversant, grossly normal bilateral upper and lower extremity strength & ROM   SKIN: Multiple areas of flat, central cleared rash especially on her hands, arms, and extensor surfaces as well as her palms.  PSYCH: Calm and cooperative, interactive      ED Course, Procedures, & Data      Procedures        No results found for any visits on 12/14/23.  Medications   loratadine (CLARITIN) tablet 10 mg (has no administration in time range)     Labs Ordered and Resulted from Time of ED Arrival to Time of ED Departure - No data to display  No orders to display          Critical care was not performed.     Medical Decision Making  The patient's presentation was of moderate complexity (a chronic illness mild to moderate exacerbation, progression, or side effect of treatment).    The patient's evaluation involved:  review of external note(s) from 3+ sources (see separate area of note for details)  review of 3+ test result(s) ordered prior to this encounter (see separate area of note for details)    The patient's management necessitated moderate risk (prescription drug management including medications given in the ED).    Assessment & Plan    33-year-old currently in the workup for rheumatological cause of her rash presents with ongoing body aches, rash throughout her body that is itchy, as well as some increasing swelling around her eyes for the last 2 weeks and some tearing from her right eye    She does have some slight periorbital swelling.  No true ptosis, normal extraocular movements.  She notes that she is having some pain in her right ear but  appears to be within normal limits at this time.  All of this lends to a possibility of possible underlying rheumatological versus urticaria versus mast cell activation.  Low suspicion at this time for periorbital cellulitis.  Low suspicion at this time for angioedema.  No significant mucosal involvement with lower suspicion at this point in time for Hua-Rafael syndrome, or other similar rashes.  Last evaluation with normal lab work overall, and at this point in time do not believe that further labs will be beneficial.  Patient does note that it has been a number of years before she was evaluated by dermatology.  With the recurrent and worsening of her symptoms, I do believe it would be reasonable at this point in time to give her a steroid burst, increase her Claritin as needed in the next couple of days for some of this itchiness and swelling, and have her referred to dermatology.  She currently has a rheumatological appointment at the beginning of January which I believe is still appropriate.    I have reviewed the nursing notes. I have reviewed the findings, diagnosis, plan and need for follow up with the patient.    New Prescriptions    LORATADINE (CLARITIN) 10 MG TABLET    Take 1 tablet (10 mg) by mouth every 8 hours as needed for allergies    PREDNISONE (DELTASONE) 20 MG TABLET    Take two tablets (= 40mg) each day for 5 (five) days       Final diagnoses:   Urticaria   Periorbital swelling       Kina Ojeda MD  [facility] EMERGENCY DEPARTMENT  12/14/2023     Kina Ojeda MD  12/14/23 0806

## 2023-12-14 NOTE — DISCHARGE INSTRUCTIONS
You were seen in the emergency department due to itchiness, and swelling around your eyes, and with other rashes on your arms.  At this point in time, the cause of your symptoms is unclear but we do recommend that you continue to follow-up with rheumatology as previously planned in January, and we would recommend that you be reevaluated by dermatology in the meantime as well.  A referral was placed for this and they will typically call you in the next 2 days to schedule it.    In the meantime, we gave you a burst of steroids that you can take for the next 5 days which should help with some of the swelling.  You can also take Claritin up to 3-4 times a day in the next few days while you are having a significant amount of itchiness and swelling.  Return to the emergency department with any difficulty breathing, swelling of your tongue, lips, or any other concerns.

## 2023-12-17 ENCOUNTER — HEALTH MAINTENANCE LETTER (OUTPATIENT)
Age: 33
End: 2023-12-17

## 2024-01-02 ENCOUNTER — OFFICE VISIT (OUTPATIENT)
Dept: RHEUMATOLOGY | Facility: CLINIC | Age: 34
End: 2024-01-02
Attending: STUDENT IN AN ORGANIZED HEALTH CARE EDUCATION/TRAINING PROGRAM
Payer: COMMERCIAL

## 2024-01-02 ENCOUNTER — LAB (OUTPATIENT)
Dept: LAB | Facility: CLINIC | Age: 34
End: 2024-01-02
Attending: STUDENT IN AN ORGANIZED HEALTH CARE EDUCATION/TRAINING PROGRAM
Payer: COMMERCIAL

## 2024-01-02 VITALS
SYSTOLIC BLOOD PRESSURE: 122 MMHG | DIASTOLIC BLOOD PRESSURE: 73 MMHG | BODY MASS INDEX: 35.11 KG/M2 | OXYGEN SATURATION: 100 % | HEART RATE: 74 BPM | WEIGHT: 211 LBS

## 2024-01-02 DIAGNOSIS — R76.8 POSITIVE ANA (ANTINUCLEAR ANTIBODY): ICD-10-CM

## 2024-01-02 DIAGNOSIS — M25.50 ARTHRALGIA, UNSPECIFIED JOINT: ICD-10-CM

## 2024-01-02 DIAGNOSIS — R76.8 POSITIVE ANA (ANTINUCLEAR ANTIBODY): Primary | ICD-10-CM

## 2024-01-02 DIAGNOSIS — M19.90 ARTHRITIS: ICD-10-CM

## 2024-01-02 DIAGNOSIS — R21 RASH: ICD-10-CM

## 2024-01-02 LAB
CK SERPL-CCNC: 65 U/L (ref 26–192)
VIT D+METAB SERPL-MCNC: 32 NG/ML (ref 20–50)

## 2024-01-02 PROCEDURE — 86160 COMPLEMENT ANTIGEN: CPT

## 2024-01-02 PROCEDURE — 82550 ASSAY OF CK (CPK): CPT

## 2024-01-02 PROCEDURE — 82306 VITAMIN D 25 HYDROXY: CPT

## 2024-01-02 PROCEDURE — 36415 COLL VENOUS BLD VENIPUNCTURE: CPT

## 2024-01-02 PROCEDURE — 86235 NUCLEAR ANTIGEN ANTIBODY: CPT

## 2024-01-02 PROCEDURE — G0463 HOSPITAL OUTPT CLINIC VISIT: HCPCS | Performed by: STUDENT IN AN ORGANIZED HEALTH CARE EDUCATION/TRAINING PROGRAM

## 2024-01-02 PROCEDURE — 82085 ASSAY OF ALDOLASE: CPT

## 2024-01-02 PROCEDURE — 99205 OFFICE O/P NEW HI 60 MIN: CPT | Performed by: STUDENT IN AN ORGANIZED HEALTH CARE EDUCATION/TRAINING PROGRAM

## 2024-01-02 PROCEDURE — 86225 DNA ANTIBODY NATIVE: CPT

## 2024-01-02 ASSESSMENT — PAIN SCALES - GENERAL: PAINLEVEL: MODERATE PAIN (5)

## 2024-01-02 NOTE — NURSING NOTE
Chief Complaint   Patient presents with    Consult     /73 (BP Location: Left arm, Patient Position: Sitting, Cuff Size: Adult Large)   Pulse 74   Wt 95.7 kg (211 lb)   LMP 12/16/2023 (Approximate)   SpO2 100%   BMI 35.11 kg/m

## 2024-01-02 NOTE — PROGRESS NOTES
RHEUMATOLOGY OUTPATIENT CLINIC NOTE     Referring Provider:  Alexx Xiao MD  [address] [place] [address]      Subjective     Current visit January 2, 2024    Gregorio Pastor is a 33 year old female who presents today for consultation.    She has been having itching and skin rash for the past 2 years, involving hands, face, neck, not involving legs or back, rash and itching was getting worse therefore she was evaluated by allergy and she was prescribed anti-histamine with some benefit. She took prednisone for 2 days and helped.     She has been having joint for the past 2 years in most of the joints, without noticeable swelling, pain is worse with activity and not present in the early morning. She tried tylenol and helped.     She denies alopecia, oral sores, pleuritic chest pain  She denies fever, unintentional weight loss.   She denies raynaud's phenomena, ulcers or scars   She has subjective eye dryness but she does not use eye drops. She does not have subjective mouth dryness.   She denies dysphagia. She denies muscle weakness  She has never smoked, No family history of autoimmune disease.    She has been pregnant 7 times, never had miscarriage.    Review of labs.  DAVIDE: 1: 160, speckled.  CRP 4.2.  ESR 22.    Urinalysis on 11/12/2023 without evidence of cells or proteins     ROS    Current Medications   None currently     Objective   LMP 11/15/2023 (Exact Date)       PHYSICAL EXAMINATION  Physical Exam  HENT:      Head: Normocephalic.      Mouth/Throat:      Mouth: Mucous membranes are moist.   Eyes:      Conjunctiva/sclera: Conjunctivae normal.   Cardiovascular:      Pulses: Normal pulses.      Heart sounds: Normal heart sounds.   Pulmonary:      Effort: Pulmonary effort is normal.      Breath sounds: Normal breath sounds.   Abdominal:      Palpations: Abdomen is soft.   Musculoskeletal:         General: No swelling or tenderness.      Comments:      SJC:0/28  TJC: 0/28   Skin:     Findings: No  rash.   Neurological:      General: No focal deficit present.      Mental Status: She is alert.         Assessment & Plan     # Itching   # Skin rash  # positive DAVIDE  # Arthralgia     Gregorio is a very pleasant 33-year-old female presented today for evaluation of itching rash and positive DAVIDE.  She denies symptoms suggestive of inflammatory arthritis, alopecia, oral sores, pleuritic chest pain, systemic symptoms, sicca symptoms.  There is no evidence of inflammatory arthritis on exam, oral sores, there is no evidence of skin rash today on exam, muscle power is good bilateral and symmetric in the upper and lower extremities.    Her labs showed mildly positive DAVIDE, speckled pattern which is nonspecific, normal CRP and slightly elevated ESR.    She does not have other features to suggest systemic lupus, Sjogren, systemic sclerosis, dermatomyositis.  Nonetheless we discussed completing the autoimmune workup with checking complement, double-stranded DNA, VIRGILIO panel, CK and aldolase.  We did also check serum vitamin D level given arthralgia.    She is scheduled to see our colleagues in dermatology in 1 week so we will defer to them for management of itching skin rash and consideration of other dermatologic causes.    I will reach out to her after the test results to determine the next steps.  She is in agreement this plan and seems comfortable with this approach.    60 minutes spent by me on the date of the encounter doing chart review, history and exam, documentation and further activities per the note      Kayla Gastelum MD  [facility] RHEUMATOLOGY    Addendum    Labs obtained after the visit showed normal complement C3/C4, negative double-stranded DNA, VIRGILIO panel.  Normal levels for vitamin D, CK, aldolase.     Therefore it is less likely that her underlying itching rash is secondary to lupus and will be no indication to initiate immunosuppressive therapy at the current time from rheumatology perspective  and no formal follow-up with rheumatology is needed unless symptoms change/worsened or new symptoms arise.  Further management of itching rash per our colleagues in dermatology    Kayla Gastelum MD

## 2024-01-02 NOTE — LETTER
1/2/2024       RE: Gregorio Pastor  [address]  [facility] [address]     Dear Colleague,    Thank you for referring your patient, Gregorio Pastor, to the [facility] RHEUMATOLOGY at [facility]. Please see a copy of my visit note below.    RHEUMATOLOGY OUTPATIENT CLINIC NOTE     Referring Provider:  Alexx Xiao MD  [address] [place] [address]      Subjective    Current visit January 2, 2024    Gregorio Pastor is a 33 year old female who presents today for consultation.    She has been having itching and skin rash for the past 2 years, involving hands, face, neck, not involving legs or back, rash and itching was getting worse therefore she was evaluated by allergy and she was prescribed anti-histamine with some benefit. She took prednisone for 2 days and helped.     She has been having joint for the past 2 years in most of the joints, without noticeable swelling, pain is worse with activity and not present in the early morning. She tried tylenol and helped.     She denies alopecia, oral sores, pleuritic chest pain  She denies fever, unintentional weight loss.   She denies raynaud's phenomena, ulcers or scars   She has subjective eye dryness but she does not use eye drops. She does not have subjective mouth dryness.   She denies dysphagia. She denies muscle weakness  She has never smoked, No family history of autoimmune disease.    She has been pregnant 7 times, never had miscarriage.    Review of labs.  DAVIDE: 1: 160, speckled.  CRP 4.2.  ESR 22.    Urinalysis on 11/12/2023 without evidence of cells or proteins     ROS    Current Medications   None currently     Objective  LMP 11/15/2023 (Exact Date)       PHYSICAL EXAMINATION  Physical Exam  HENT:      Head: Normocephalic.      Mouth/Throat:      Mouth: Mucous membranes are moist.   Eyes:      Conjunctiva/sclera: Conjunctivae normal.   Cardiovascular:      Pulses: Normal pulses.       Heart sounds: Normal heart sounds.   Pulmonary:      Effort: Pulmonary effort is normal.      Breath sounds: Normal breath sounds.   Abdominal:      Palpations: Abdomen is soft.   Musculoskeletal:         General: No swelling or tenderness.      Comments:      SJC:0/28  TJC: 0/28   Skin:     Findings: No rash.   Neurological:      General: No focal deficit present.      Mental Status: She is alert.         Assessment & Plan    # Itching   # Skin rash  # positive DAVIDE  # Arthralgia     Gregorio is a very pleasant 33-year-old female presented today for evaluation of itching rash and positive DAVIDE.  She denies symptoms suggestive of inflammatory arthritis, alopecia, oral sores, pleuritic chest pain, systemic symptoms, sicca symptoms.  There is no evidence of inflammatory arthritis on exam, oral sores, there is no evidence of skin rash today on exam, muscle power is good bilateral and symmetric in the upper and lower extremities.    Her labs showed mildly positive DAVIDE, speckled pattern which is nonspecific, normal CRP and slightly elevated ESR.    She does not have other features to suggest systemic lupus, Sjogren, systemic sclerosis, dermatomyositis.  Nonetheless we discussed completing the autoimmune workup with checking complement, double-stranded DNA, VIRGILIO panel, CK and aldolase.  We did also check serum vitamin D level given arthralgia.    She is scheduled to see our colleagues in dermatology in 1 week so we will defer to them for management of itching skin rash and consideration of other dermatologic causes.    I will reach out to her after the test results to determine the next steps.  She is in agreement this plan and seems comfortable with this approach.    60 minutes spent by me on the date of the encounter doing chart review, history and exam, documentation and further activities per the note      Kayla Gastelum MD  [facility] RHEUM[facility]Y    Addendum    Labs obtained after the visit showed  normal complement C3/C4, negative double-stranded DNA, VIRGILIO panel.  Normal levels for vitamin D, CK, aldolase.     Therefore it is less likely that her underlying itching rash is secondary to lupus and will be no indication to initiate immunosuppressive therapy at the current time from rheumatology perspective and no formal follow-up with rheumatology is needed unless symptoms change/worsened or new symptoms arise.  Further management of itching rash per our colleagues in dermatology    Kayla Gastelum MD

## 2024-01-03 LAB
ALDOLASE SERPL-CCNC: 3 U/L
C3 SERPL-MCNC: 103 MG/DL (ref 81–157)
C4 SERPL-MCNC: 14 MG/DL (ref 13–39)
DSDNA AB SER-ACNC: 0.8 IU/ML
ENA SM IGG SER IA-ACNC: 1 U/ML
ENA SM IGG SER IA-ACNC: NEGATIVE
ENA SS-A AB SER IA-ACNC: <0.5 U/ML
ENA SS-A AB SER IA-ACNC: NEGATIVE
ENA SS-B IGG SER IA-ACNC: <0.6 U/ML
ENA SS-B IGG SER IA-ACNC: NEGATIVE
U1 SNRNP IGG SER IA-ACNC: 2.7 U/ML
U1 SNRNP IGG SER IA-ACNC: NEGATIVE

## 2024-01-11 ENCOUNTER — OFFICE VISIT (OUTPATIENT)
Dept: DERMATOLOGY | Facility: CLINIC | Age: 34
End: 2024-01-11
Payer: COMMERCIAL

## 2024-01-11 DIAGNOSIS — L50.9 URTICARIA: ICD-10-CM

## 2024-01-11 DIAGNOSIS — L30.9 DERMATITIS: Primary | ICD-10-CM

## 2024-01-11 DIAGNOSIS — H57.89 PERIORBITAL SWELLING: ICD-10-CM

## 2024-01-11 PROCEDURE — 88312 SPECIAL STAINS GROUP 1: CPT | Mod: 26 | Performed by: DERMATOLOGY

## 2024-01-11 PROCEDURE — 2894A VOIDCORRECT: CPT | Mod: GC | Performed by: DERMATOLOGY

## 2024-01-11 PROCEDURE — 11104 PUNCH BX SKIN SINGLE LESION: CPT | Mod: GC

## 2024-01-11 PROCEDURE — 88305 TISSUE EXAM BY PATHOLOGIST: CPT | Mod: TC | Performed by: DERMATOLOGY

## 2024-01-11 PROCEDURE — 88305 TISSUE EXAM BY PATHOLOGIST: CPT | Mod: 26 | Performed by: DERMATOLOGY

## 2024-01-11 PROCEDURE — 99204 OFFICE O/P NEW MOD 45 MIN: CPT | Mod: 25 | Performed by: DERMATOLOGY

## 2024-01-11 RX ORDER — FLUOCINOLONE ACETONIDE 0.11 MG/ML
OIL TOPICAL 2 TIMES DAILY
Qty: 118.28 ML | Refills: 1 | Status: SHIPPED | OUTPATIENT
Start: 2024-01-11

## 2024-01-11 RX ORDER — TRIAMCINOLONE ACETONIDE 1 MG/G
OINTMENT TOPICAL
Qty: 453.6 G | Refills: 0 | Status: SHIPPED | OUTPATIENT
Start: 2024-01-11

## 2024-01-11 RX ORDER — CETIRIZINE HYDROCHLORIDE 10 MG/1
10 TABLET ORAL DAILY
Qty: 30 TABLET | Refills: 3 | Status: SHIPPED | OUTPATIENT
Start: 2024-01-11

## 2024-01-11 RX ORDER — TACROLIMUS 1 MG/G
OINTMENT TOPICAL 2 TIMES DAILY
Qty: 60 G | Refills: 0 | Status: SHIPPED | OUTPATIENT
Start: 2024-01-11

## 2024-01-11 NOTE — PATIENT INSTRUCTIONS
We would like you to start using the triamcinolone ointment twice a day to areas of itchy rash on the body.     For your face and ears, please start using the tacrolimus ointment twice a day (the small tube). This ointment can burn a little the first 1-2x you use it, you can put it in the refrigerator to help alleviate this burning sensation.     For your scalp, you can use the dermasmoothe oil to help with any itching you may have.       Wound Care After a Biopsy    What is a skin biopsy?  A skin biopsy allows the doctor to examine a very small piece of tissue under the microscope to determine the diagnosis and the best treatment for the skin condition. A local anesthetic (numbing medicine) is injected with a very small needle into the skin area to be tested. A small piece of skin is taken from the area. Sometimes a suture (stitch) is used.     What are the risks of a skin biopsy?  I will experience scar, bleeding, swelling, pain, crusting and redness. I may experience incomplete removal or recurrence. Risks of this procedure are excessive bleeding, bruising, infection, nerve damage, numbness, thick (hypertrophic or keloidal) scar and non-diagnostic biopsy.    How should I care for my wound for the first 24 hours?  Keep the wound dry and covered for 24 hours  If it bleeds, hold direct pressure on the area for 15 minutes. If bleeding does not stop, call us or go to the emergency room  Avoid strenuous exercise the first 1-2 days or as your doctor instructs you    How should I care for the wound after 24 hours?  After 24 hours, remove the bandage  You may bathe or shower as normal  If you had a scalp biopsy, you can shampoo as usual and can use shower water to clean the biopsy site daily  Clean the wound once a day with gentle soap and water  Do not scrub, be gentle  Apply white petroleum/Vaseline after cleaning the wound with a cotton swab or a clean finger, and keep the site covered with a Bandaid /bandage. Bandages  are not necessary with a scalp biopsy  If you are unable to cover the site with a Bandaid /bandage, re-apply ointment 2-3 times a day to keep the site moist. Moisture will help with healing  Avoid strenuous activity for first 1-2 days  Avoid lakes, rivers, pools, and oceans until the stitches are removed or the site is healed    How do I clean my wound?  Wash hands thoroughly with soap or use hand  before all wound care  Clean the wound with gentle soap and water  Apply white petroleum/Vaseline  to wound after it is clean  Replace the Bandaid /bandage to keep the wound covered for the first few days or as instructed by your doctor  If you had a scalp biopsy, warm shower water to the area on a daily basis should suffice    What should I use to clean my wound?   Cotton-tipped applicators (Qtips )  White petroleum jelly (Vaseline ). Use a clean new container and use Q-tips to apply.  Bandaids  as needed  Gentle soap     How should I care for my wound long term?  Do not get your wound dirty  Keep up with wound care for one week or until the area is healed.  If you have stitches, stitches need to be removed in 14 days. You may return to our clinic for this or you may have it done locally at your doctor s office.  A small scab will form and fall off by itself when the area is completely healed. The area will be red and will become pink in color as it heals. Sun protection is very important for how your scar will turn out. Sunscreen with an SPF 30 or greater is recommended once the area is healed.  You should have some soreness but it should be mild and slowly go away over several days. Talk to your doctor about using tylenol for pain,    When should I call my doctor?  If you have increased:   Pain or swelling  Pus or drainage (clear or slightly yellow drainage is ok)  Temperature over 100F  Spreading redness or warmth around wound    When will I hear about my results?  The biopsy results can take 2 weeks to come  "back.  Your results will automatically release to [employer] before your provider has even reviewed them.  The clinic will call you with the results, send you a [employer] message, or have you schedule a follow-up clinic or phone time to discuss the results.  Contact our clinics if you do not hear from us in 2 weeks.    Who should I call with questions?  [facility]: [phone]  [facility]: [phone]  For urgent needs outside of business hours call the [facility] at [phone] and ask for the dermatology resident on call   Gentle Skin Care    Gentle skin care starts with good bathing and keeping the skin moist. Gentle skin care helps those with sensitive skin and eczema. It also helps with long-lasting (chronic) dry skin.  Skin care products  Here are some gentle skin care products you may want to try.** You can try other brands too. Generic and store brands are OK as well. Just make sure everything is fragrance free.  Mild cleansers (instead of soap):  Aquaphor 2-in-1 Gentle Wash and Shampoo  CeraVe  Cetaphil Gentle Cleanser (But stay away from Cetaphil's \"baby\" line, because it has fragrance.)  Dove Fragrance Free Bar  Vanicream Cleansing Bar  Shampoos and conditioners:  Aquaphor 2-in-1 Gentle Wash and Shampoo  [place] Baby \"Super Sensitive\" Shampoo  Free and Clear by Vanicream  Moisturizers:  Creams: Cetaphil cream, CeraVe cream, Eucerin cream and Vanicream  Ointments: Aquaphor, CeraVe Healing Ointment, Vaseline, petroleum jelly and Vaniply  Don't use lotion: It's too thin for eczema. It can also have alcohol, which irritates the skin. Ointments and creams work better.  Oils:  Mineral oil  Safflower oil  Coconut and sunflower seed oil work for some children.  Sunblock:   Use sunscreens that have zinc oxide or titanium dioxide. These block the sun.  Make sure the sunblock has SPF 30 or more.  Don't use spray cans (aerosols) or \"chemical\" " "sunscreens if you can avoid them.  Laundry products:  All Free and Clear  Cheer Free  Tide Free  Generic brands are OK as long as they are \"Fragrance Free.\"  Don't use fabric softeners or dryer sheets.  Stay away from these products:   Don't use products that have added fragrance.  \"Organic\" does not mean \"fragrance free.\" In fact, some organic products have plant parts that can irritate sensitive skin.  Many \"baby\" products have added fragrance that may bother your skin.   Skin care tips  Daily bathing in a tub bath is best to soak the skin and get clean.  Use lukewarm water.  Keep bathing and showering short--less than 15 minutes.  When you wash, focus on the skin folds, face and feet.  After bathing, pat the skin lightly with a towel. Don't rub or scrub when drying.  Put on moisturizer right away after the bath.  If the doctor prescribed medicine to put on the skin, put the medicine on first. Then put on the moisturizer.  Use moisturizing creams at least 2 times a day on the whole body. For example, in the morning and before bed.   \"Do's\" and \"Don'ts\"  Do  Bathe in a tub rather than shower whenever you can.  Wash new clothes before you wears them for the first time.  Put on moisturizing creams or ointments at least twice daily to the whole body.  Don't  Don't use bubble bath.  Don't scrub hard when cleaning the skin.  Don't use skin lotion instead of cream. Lotions don't work as well.  Don't use products like powders, perfumes or colognes.  Don't dress in \"scratchy\" clothes, like wool.  **We don't endorse any specific product or brand. The products listed here are just examples.  For informational purposes only. Not to replace the advice of your health care provider. Clinically reviewed by [facility]. Copyright   2017 [employer]. All rights reserved. [employer] 066709 - REV 10/21.          "

## 2024-01-11 NOTE — NURSING NOTE
"Dermatology Rooming Note    Gregorio Pastor's goals for this visit include:   Chief Complaint   Patient presents with    Derm Problem     Itchy, dry skin: all over body, per patient  Eye irritation  Scalp is \"burning\" per patient  She finished the prednisone and takes Claritin every other day     Liliane Villar LPN    "

## 2024-01-11 NOTE — NURSING NOTE
Lidocaine-epinephrine 1-1:874611 % injection   1mL once for one use, starting 1/11/2024 ending 1/11/2024,  2mL disp, R-0, injection  Injected by Liliane Villar LPN

## 2024-01-11 NOTE — Clinical Note
1/11/2024       RE: Gregorio Pastor  [address]  [facility] [address]     Dear Colleague,    Thank you for referring your patient, Gregorio Pastor, to the [facility] DERMATOLOGY CLINIC [place] at [facility]. Please see a copy of my visit note below.    [facility] Dermatology Note  Encounter Date: Jan 11, 2024  Office Visit     Dermatology Problem List:  1. ***  2. Dermatographism  Current tx: Cetirizine  3. DAVIDE: 1:160, speckled.   ____________________________________________    Assessment & Plan:     #dermatitis, favoring allergic contact versus atopic  Recent rheumatology work-up notable for a mildly positive DAVIDE, speckled pattern (nonspecific), normal CRP and slightly elevated ESR.  Unremarkable complement, double-stranded DNA, VIRGILIO panel, Vitamin D CK and aldolase.     Future: consider dupixent        Procedures Performed:   - Shave biopsy procedure note, location(s): ***. After discussion of benefits and risks including but not limited to bleeding, infection, scar, incomplete removal, recurrence, and non-diagnostic biopsy, written consent and photographs were obtained. The area was cleaned with isopropyl alcohol. 0.5mL of 1% lidocaine with epinephrine was injected to obtain adequate anesthesia of lesion(s). Shave biopsy at site(s) performed. Hemostasis was achieved with aluminium chloride. Petrolatum ointment and a sterile dressing were applied. The patient tolerated the procedure and no complications were noted. The patient was provided with verbal and written post care instructions.     - Cryotherapy procedure note, location(s): ***. After verbal consent and discussion of risks and benefits including, but not limited to, dyspigmentation/scar, blister, and pain, *** lesion(s) was(were) treated with 1-2 mm freeze border for 1-2 cycles with liquid nitrogen. Post cryotherapy instructions were  provided.  {kkprocedurenotes:[id number]}    None    Follow-up: ***, prn for new or changing lesions    Staff and Resident:     Edgardo Thomas, MD  Medicine-Dermatology Resident, PGY-2  ____________________________________________    CC: No chief complaint on file.    HPI:  Ms. Gregorio Pastor is a(n) 33 year old female who presents today as a new patient for dermatographia responsive to antihistamines and prednisone, rash, and body aches. Was recently evaluated by rheumatology for these symptoms.     Cetirizine every other day, is somewhat effective  Was normal when taking prednisone, but now .     Allergies      - No hair loss, oral sores; + dry eyes  - No fevers or weight loss  -Joint pain in most joints, worse with activity, responsive to tylenol.   - She has never smoked, no family history of autoimmune disease.     Patient is otherwise feeling well, without additional skin concerns.    Labs Reviewed:  ***N/A    Physical Exam:  Vitals: LMP 12/16/2023 (Approximate)   SKIN: {kkSkinExam:[id number]}  - ***  - There are dome shaped bright red papules on the trunk and extremities.   - Multiple regular brown pigmented macules and papules are identified on the trunk and extremities.   - Scattered brown macules on sun exposed areas.  - There are waxy stuck on tan to brown papules on the trunk and extremities.   - No other lesions of concern on areas examined.     Medications:  Current Outpatient Medications   Medication    acetaminophen (TYLENOL) 500 MG tablet    loratadine (CLARITIN) 10 MG tablet    predniSONE (DELTASONE) 20 MG tablet     No current facility-administered medications for this visit.      Past Medical History:   There is no problem list on file for this patient.    Past Medical History:   Diagnosis Date    Helicobacter pylori (H. pylori) infection        CC No referring provider defined for this encounter. on close of this encounter.    [facility] Dermatology Note  Encounter Date: Jan 11,  2024  Office Visit     Dermatology Problem List:  1. Dermatitis, favoring allergic contact versus atopic, ddx chronic urticaria, >30-40% BSA  Pending punch bx 1/11/24  Current tx: tacro, triamcinolone, derma-smoothe, cetirizine as below  Future: dupixent, lights, dermato-allergy referral  2. History of Dermatographism, ?urticaria  Current tx: Cetirizine 10 mg daily  Future: consider increasing antihistamine dose + adding sedating antihistamine for the evening    Notable lab work-up DAVIDE: 1:160, speckled; Unremarkable complement, double-stranded DNA, VIRGILIO panel, Vitamin D CK and aldolase December '23  ____________________________________________    Assessment & Plan:     #Dermatitis, favoring allergic contact versus atopic, 30-40% BSA; chronic, flaring  Significant wide-spread pruritic lichenified plaques with some mild left sided aiyana-orbital edema causing significant QoL impairment without clear infectious or environmental trigger. Recent rheumatology work-up notable for a mildly positive DAVIDE, speckled pattern (nonspecific), normal CRP and slightly elevated ESR.  Unremarkable complement, double-stranded DNA, VIRGILIO panel, Vitamin D CK and aldolase. Ddx currently favoring ACD versus atopic dermatitis, less likely chronic urticaria or CTD given recent negative serologic work-up, although in shared decision making will get a skin biopsy today to help confirm this diagnosis. Given partial relief with antihistamines will schedule this therapy and start intensive topical therapy. Will consider further systemic treatment pending bx and response and may need allergy testing to identify trigger.   -START Triamcinolone (body), Tacrolimus (face/ears), Dermasmoothe (scalp), gentle emollients (vaseline)  -FUTURE: consider Dupixent, Lights, Dermato-allergology referral with Dr. Recio    Procedures Performed:   Punch biopsy:  After discussion of benefits and risks including but not limited to bleeding/bruising, pain/swelling,  "infection, scar, incomplete removal, nerve damage/numbness, recurrence, and non-diagnostic biopsy,  verbal consent and photographs were obtained. Time-out was performed. The area was cleaned with isopropyl alcohol. 0.5mL of 1% lidocaine with epinephrine was injected to obtain adequate anesthesia of the lesion. A 3 mm punch biopsy was performed.  4-0 prolene sutures were utilized to approximate the epidermal edges.  White petroleum jelly/VaselineTM and a bandage was applied to the wound.  Explicit verbal and written wound care instructions were provided.  The patient left the Dermatology Clinic in good condition. The patient was counseled to follow up for suture removal in approximately 14 days. Patient will remove the suture at home.      Follow-up: 2 months, prn for new or changing lesions    Staff and Resident:     Staffed with Dr. Valdez.     Edgardo Thomas MD  Medicine-Dermatology Resident, PGY-2  ____________________________________________    CC: Derm Problem (Itchy, dry skin: all over body, per patient/Eye irritation/Scalp is \"burning\" per patient/She finished the prednisone and takes Claritin every other day)    HPI:  Ms. Gregorio Pastor is a(n) 33 year old female who presents today as a new patient for dermatographia responsive to antihistamines and prednisone, rash, and body aches. Was recently evaluated by rheumatology for these symptoms.     - Rash has been present for ~2 years, very itchy. Keeps her up at night most nights and she has gone into the ED.   - Cetirizine every other day, is somewhat effective. Only uses when the itching is the worst.   - Was normal when taking prednisone, but now is flaring. She last took prednisone last week.   - No known allergies, but does get stuffy nose and itchy eyes during the Spring and Summer months  - No clear triggers.   - No hair loss, oral sores; + watery eyes. No vision changes or pain when moving her eyes.   - No fevers or weight loss.   - Joint pain in most " joints, worse with activity, responsive to tylenol. Also has some tingling and shooting pains down the right side, these are very intermittent.   - She has never smoked, no family history of autoimmune disease.     Patient is otherwise feeling well, without additional skin concerns.    Labs Reviewed:  See A&P.    Physical Exam:  Vitals: LMP 12/16/2023 (Approximate)   SKIN: Focused examination of face, arms, and neck was performed per patient preference.  - Widespread lichenified, erythematous plaques on the neck, arms and palmar surface of the hands.  - Slight right sided periorbital edema with increased tearing, EOMI intact.   - No other lesions of concern on areas examined.     Medications:  Current Outpatient Medications   Medication     acetaminophen (TYLENOL) 500 MG tablet     cetirizine (ZYRTEC) 10 MG tablet     fluocinolone acetonide (DERMA SMOOTHE/FS BODY) 0.01 % external oil     loratadine (CLARITIN) 10 MG tablet     tacrolimus (PROTOPIC) 0.1 % external ointment     triamcinolone (KENALOG) 0.1 % external ointment     predniSONE (DELTASONE) 20 MG tablet     No current facility-administered medications for this visit.      Past Medical History:   There is no problem list on file for this patient.    Past Medical History:   Diagnosis Date     Helicobacter pylori (H. pylori) infection        CC No referring provider defined for this encounter. on close of this encounter.      Again, thank you for allowing me to participate in the care of your patient.      Sincerely,    Edgardo Thomas MD

## 2024-01-15 LAB
PATH REPORT.COMMENTS IMP SPEC: NORMAL
PATH REPORT.FINAL DX SPEC: NORMAL
PATH REPORT.GROSS SPEC: NORMAL
PATH REPORT.MICROSCOPIC SPEC OTHER STN: NORMAL
PATH REPORT.RELEVANT HX SPEC: NORMAL

## 2024-06-05 ENCOUNTER — OFFICE VISIT (OUTPATIENT)
Dept: FAMILY MEDICINE | Facility: CLINIC | Age: 34
End: 2024-06-05
Payer: COMMERCIAL

## 2024-06-05 VITALS
WEIGHT: 213.1 LBS | RESPIRATION RATE: 18 BRPM | OXYGEN SATURATION: 100 % | DIASTOLIC BLOOD PRESSURE: 66 MMHG | SYSTOLIC BLOOD PRESSURE: 104 MMHG | TEMPERATURE: 97.1 F | HEIGHT: 64 IN | BODY MASS INDEX: 36.38 KG/M2 | HEART RATE: 69 BPM

## 2024-06-05 DIAGNOSIS — R10.84 ABDOMINAL PAIN, GENERALIZED: ICD-10-CM

## 2024-06-05 DIAGNOSIS — N76.0 ACUTE VAGINITIS: ICD-10-CM

## 2024-06-05 DIAGNOSIS — Z12.4 SCREENING FOR MALIGNANT NEOPLASM OF CERVIX: ICD-10-CM

## 2024-06-05 DIAGNOSIS — N63.0 BREAST SWELLING: ICD-10-CM

## 2024-06-05 DIAGNOSIS — L20.9 ATOPIC DERMATITIS, UNSPECIFIED TYPE: Primary | ICD-10-CM

## 2024-06-05 LAB
CLUE CELLS: ABNORMAL
ERYTHROCYTE [DISTWIDTH] IN BLOOD BY AUTOMATED COUNT: 13.9 % (ref 10–15)
HCT VFR BLD AUTO: 38.4 % (ref 35–47)
HGB BLD-MCNC: 12.1 G/DL (ref 11.7–15.7)
MCH RBC QN AUTO: 28.1 PG (ref 26.5–33)
MCHC RBC AUTO-ENTMCNC: 31.5 G/DL (ref 31.5–36.5)
MCV RBC AUTO: 89 FL (ref 78–100)
PLATELET # BLD AUTO: 193 10E3/UL (ref 150–450)
RBC # BLD AUTO: 4.3 10E6/UL (ref 3.8–5.2)
TRICHOMONAS, WET PREP: ABNORMAL
WBC # BLD AUTO: 6 10E3/UL (ref 4–11)
WBC'S/HIGH POWER FIELD, WET PREP: ABNORMAL
YEAST, WET PREP: ABNORMAL

## 2024-06-05 PROCEDURE — 36415 COLL VENOUS BLD VENIPUNCTURE: CPT | Performed by: FAMILY MEDICINE

## 2024-06-05 PROCEDURE — 87210 SMEAR WET MOUNT SALINE/INK: CPT | Performed by: FAMILY MEDICINE

## 2024-06-05 PROCEDURE — 99214 OFFICE O/P EST MOD 30 MIN: CPT | Performed by: FAMILY MEDICINE

## 2024-06-05 PROCEDURE — 80053 COMPREHEN METABOLIC PANEL: CPT | Performed by: FAMILY MEDICINE

## 2024-06-05 PROCEDURE — 86364 TISS TRNSGLTMNASE EA IG CLAS: CPT | Performed by: FAMILY MEDICINE

## 2024-06-05 PROCEDURE — 87624 HPV HI-RISK TYP POOLED RSLT: CPT | Performed by: FAMILY MEDICINE

## 2024-06-05 PROCEDURE — 84443 ASSAY THYROID STIM HORMONE: CPT | Performed by: FAMILY MEDICINE

## 2024-06-05 PROCEDURE — 85027 COMPLETE CBC AUTOMATED: CPT | Performed by: FAMILY MEDICINE

## 2024-06-05 PROCEDURE — G0145 SCR C/V CYTO,THINLAYER,RESCR: HCPCS | Performed by: FAMILY MEDICINE

## 2024-06-05 PROCEDURE — 86258 DGP ANTIBODY EACH IG CLASS: CPT | Performed by: FAMILY MEDICINE

## 2024-06-05 RX ORDER — CLOBETASOL PROPIONATE 0.5 MG/G
OINTMENT TOPICAL 2 TIMES DAILY
Qty: 45 G | Refills: 0 | Status: SHIPPED | OUTPATIENT
Start: 2024-06-05

## 2024-06-05 ASSESSMENT — PAIN SCALES - GENERAL: PAINLEVEL: NO PAIN (0)

## 2024-06-05 NOTE — PROGRESS NOTES
Assessment & Plan     Gregorio was seen today for breast problem and derm problem.    Diagnoses and all orders for this visit:    Atopic dermatitis, unspecified type  History and clinical presentation are consistent with atopic dermatitis intrinsic versus allergic.  Patient was given a prescription for clobetasol topical.  I recommended considering starting Dupixent  -     clobetasol (TEMOVATE) 0.05 % external ointment; Apply topically 2 times daily Apply to both arms and hands  -     TSH with free T4 reflex; Future  -     Comprehensive metabolic panel (BMP + Alb, Alk Phos, ALT, AST, Total. Bili, TP); Future  -     CBC with platelets; Future    Breast swelling  Soft mobile mass on the left upper quadrant of the left breast.  No nipple drainage or tenderness to palpation noted.  Likely fibroadenoma  -     MA Diagnostic Digital Bilateral; Future  -     US Breast Left Limited 1-3 Quadrants; Future    Abdominal discomfort, generalized  Patient has diffuse generalized abdominal discomfort and bloating.  Initial symptoms were attributed to Candida overgrowth she was seen and evaluated by an online provider.  She received 3 months of  Treatment with multiple antibiotics and over-the-counter medications.  She was seen and evaluated by an allergist previously will complete the workup by testing for gluten sensitivity  -     Deamidated Giladin Peptide Raymond IgA IgG; Future  -     Tissue transglutaminase raymond IgA and IgG; Future    Screening for malignant neoplasm of cervix  -     Pap Screen with HPV - Recommended Age 30 - 65 Years    Acute vaginitis  -     Wet prep - Clinic Collect    Other orders  -     REVIEW OF HEALTH MAINTENANCE PROTOCOL ORDERS  -     PRIMARY CARE FOLLOW-UP SCHEDULING; Future         Follow-up Visit   Expected date:  Jun 19, 2024 (Approximate)      Follow Up Appointment Details:     Follow-up with whom?: Me    Follow-Up for what?: Adult Preventive    How?: In Person                       BMI  Estimated body  "mass index is 36.58 kg/m  as calculated from the following:    Height as of this encounter: 1.626 m (5' 4\").    Weight as of this encounter: 96.7 kg (213 lb 1.6 oz).     Kevin Perkins is a 33 year old, presenting for the following health issues:  Breast Problem and Derm Problem        6/5/2024    10:32 AM   Additional Questions   Roomed by Bette CABRERA     History of Present Illness       Reason for visit:  I have right side pain    She eats 4 or more servings of fruits and vegetables daily.She consumes 2 sweetened beverage(s) daily.She exercises with enough effort to increase her heart rate 30 to 60 minutes per day.    She is taking medications regularly.       Concern - Dry, Itchy Skin  Onset: A couple months  Description: Dry, Itchy, Needle-poking sensations - mostly hands, but also head and vaginal areas at times  Intensity: moderate, severe  Progression of Symptoms:  worsening  Accompanying Signs & Symptoms: None  Previous history of similar problem: None  Precipitating factors:        Worsened by: Eating dairy, gluten makes things really itchy  Alleviating factors:        Improved by: None  Therapies tried and outcome: Creams don't help    Breast Concern  Onset/Duration: A couple months ago  Description:   Location: Left more swollen than right  Pain or tenderness: YES  Redness: No  Intensity: mild  Progression of Symptoms: improving  Accompanying Signs & Symptoms:  Any lumps in axillary region: No  Movable: Normal  Nipple discharge: None  Changes in the skin or nipple: None  On Hormone therapy: No  Does it change with menstrual cycle: YES- bleeding for only 2 days  Previous history of similar problem: Ultrasound 2018 - exam normal  First degree relative with breast cancer: a negative family history for breast cancer.  Precipitating factors:           Worsened by: None  Alleviating factors:            Improved by: Drinking tumeric and francisco, Castrol and coconut oil   Therapies tried and outcome: See " "above  Patient's last menstrual period was 05/17/2024 (exact date).    Patient has a diffuse atopic rash on bilateral hands, arms and lips. she was seen and evaluated by dermatology and was diagnosed with atopic dermatitis.  Patient was given a prescription for Dexilant luminous and topical corticosteroid.     Review of Systems  Constitutional, neuro, ENT, endocrine, pulmonary, cardiac, gastrointestinal, genitourinary, musculoskeletal, integument and psychiatric systems are negative, except as otherwise noted.      Objective    /66   Pulse 69   Temp 97.1  F (36.2  C) (Temporal)   Resp 18   Ht 1.626 m (5' 4\")   Wt 96.7 kg (213 lb 1.6 oz)   LMP 05/17/2024 (Exact Date)   SpO2 100%   Breastfeeding No   BMI 36.58 kg/m    Body mass index is 36.58 kg/m .  Physical Exam   GENERAL: alert and no distress  RESP: lungs clear to auscultation - no rales, rhonchi or wheezes  BREAST: Mobile soft mass in the left upper quadrant of left breast.   CV: regular rate and rhythm, normal S1 S2, no S3 or S4, no murmur, click or rub, no peripheral edema  SKIN: Scattered scaly erythematous macules on bilateral hands and flexor surface of both elbows.  Multiple excoriation marks          Signed Electronically by: Landry Guerrero MD    "

## 2024-06-05 NOTE — PATIENT INSTRUCTIONS
PLEASE CALL TO SCHEDULE YOUR MAMMOGRAM  [facility] [phone]  [facility] [place] [phone]  [facility]   [phone]

## 2024-06-06 LAB
ALBUMIN SERPL BCG-MCNC: 4.2 G/DL (ref 3.5–5.2)
ALP SERPL-CCNC: 112 U/L (ref 40–150)
ALT SERPL W P-5'-P-CCNC: 14 U/L (ref 0–50)
ANION GAP SERPL CALCULATED.3IONS-SCNC: 8 MMOL/L (ref 7–15)
AST SERPL W P-5'-P-CCNC: 27 U/L (ref 0–45)
BILIRUB SERPL-MCNC: 0.3 MG/DL
BUN SERPL-MCNC: 9 MG/DL (ref 6–20)
CALCIUM SERPL-MCNC: 8.8 MG/DL (ref 8.6–10)
CHLORIDE SERPL-SCNC: 104 MMOL/L (ref 98–107)
CREAT SERPL-MCNC: 0.53 MG/DL (ref 0.51–0.95)
DEPRECATED HCO3 PLAS-SCNC: 26 MMOL/L (ref 22–29)
EGFRCR SERPLBLD CKD-EPI 2021: >90 ML/MIN/1.73M2
GLIADIN IGA SER-ACNC: 2.1 U/ML
GLIADIN IGG SER-ACNC: <0.6 U/ML
GLUCOSE SERPL-MCNC: 87 MG/DL (ref 70–99)
POTASSIUM SERPL-SCNC: 4.2 MMOL/L (ref 3.4–5.3)
PROT SERPL-MCNC: 7.7 G/DL (ref 6.4–8.3)
SODIUM SERPL-SCNC: 138 MMOL/L (ref 135–145)
TSH SERPL DL<=0.005 MIU/L-ACNC: 1.33 UIU/ML (ref 0.3–4.2)
TTG IGA SER-ACNC: 0.7 U/ML
TTG IGG SER-ACNC: <0.6 U/ML

## 2024-06-06 NOTE — RESULT ENCOUNTER NOTE
Dear Gregorio,   The results are within the expected range. A few white blood cells noted but they are not worrisome.     Best Regards   Landry Guerrero MD

## 2024-06-07 LAB
HPV HR 12 DNA CVX QL NAA+PROBE: NEGATIVE
HPV16 DNA CVX QL NAA+PROBE: NEGATIVE
HPV18 DNA CVX QL NAA+PROBE: NEGATIVE
HUMAN PAPILLOMA VIRUS FINAL DIAGNOSIS: NORMAL

## 2024-06-12 LAB
BKR LAB AP GYN ADEQUACY: NORMAL
BKR LAB AP GYN INTERPRETATION: NORMAL
BKR LAB AP PREVIOUS ABNORMAL: NORMAL
PATH REPORT.COMMENTS IMP SPEC: NORMAL
PATH REPORT.COMMENTS IMP SPEC: NORMAL
PATH REPORT.RELEVANT HX SPEC: NORMAL

## 2024-06-20 ENCOUNTER — ANCILLARY PROCEDURE (OUTPATIENT)
Dept: MAMMOGRAPHY | Facility: CLINIC | Age: 34
End: 2024-06-20
Attending: FAMILY MEDICINE
Payer: COMMERCIAL

## 2024-06-20 DIAGNOSIS — N63.0 BREAST SWELLING: ICD-10-CM

## 2024-06-20 PROCEDURE — G0279 TOMOSYNTHESIS, MAMMO: HCPCS | Performed by: RADIOLOGY

## 2024-06-20 PROCEDURE — 76642 ULTRASOUND BREAST LIMITED: CPT | Mod: LT | Performed by: RADIOLOGY

## 2024-06-20 PROCEDURE — 77066 DX MAMMO INCL CAD BI: CPT | Performed by: RADIOLOGY

## 2024-06-26 ENCOUNTER — ANCILLARY PROCEDURE (OUTPATIENT)
Dept: MAMMOGRAPHY | Facility: CLINIC | Age: 34
End: 2024-06-26
Attending: FAMILY MEDICINE
Payer: COMMERCIAL

## 2024-06-26 DIAGNOSIS — R92.8 ABNORMAL FINDING ON BREAST IMAGING: ICD-10-CM

## 2024-06-26 DIAGNOSIS — N63.0 BREAST SWELLING: ICD-10-CM

## 2024-06-26 PROCEDURE — 19082 BX BREAST ADD LESION STRTCTC: CPT | Mod: LT | Performed by: RADIOLOGY

## 2024-06-26 PROCEDURE — 19081 BX BREAST 1ST LESION STRTCTC: CPT | Mod: LT | Performed by: RADIOLOGY

## 2024-06-26 PROCEDURE — 88305 TISSUE EXAM BY PATHOLOGIST: CPT | Mod: TC | Performed by: FAMILY MEDICINE

## 2024-06-26 PROCEDURE — G0279 TOMOSYNTHESIS, MAMMO: HCPCS | Performed by: RADIOLOGY

## 2024-06-26 PROCEDURE — 88305 TISSUE EXAM BY PATHOLOGIST: CPT | Mod: 26 | Performed by: STUDENT IN AN ORGANIZED HEALTH CARE EDUCATION/TRAINING PROGRAM

## 2024-06-26 PROCEDURE — 77066 DX MAMMO INCL CAD BI: CPT | Performed by: RADIOLOGY

## 2024-06-26 RX ORDER — LIDOCAINE HYDROCHLORIDE AND EPINEPHRINE 10; 10 MG/ML; UG/ML
10 INJECTION, SOLUTION INFILTRATION; PERINEURAL ONCE
Status: COMPLETED | OUTPATIENT
Start: 2024-06-26 | End: 2024-06-26

## 2024-06-26 RX ORDER — IOPAMIDOL 755 MG/ML
100 INJECTION, SOLUTION INTRAVASCULAR ONCE
Status: COMPLETED | OUTPATIENT
Start: 2024-06-26 | End: 2024-06-26

## 2024-06-26 RX ADMIN — IOPAMIDOL 18 ML: 755 INJECTION, SOLUTION INTRAVASCULAR at 13:20

## 2024-06-26 RX ADMIN — IOPAMIDOL 100 ML: 755 INJECTION, SOLUTION INTRAVASCULAR at 13:20

## 2024-06-26 RX ADMIN — LIDOCAINE HYDROCHLORIDE AND EPINEPHRINE 10 ML: 10; 10 INJECTION, SOLUTION INFILTRATION; PERINEURAL at 13:46

## 2024-06-28 ENCOUNTER — TELEPHONE (OUTPATIENT)
Dept: MAMMOGRAPHY | Facility: CLINIC | Age: 34
End: 2024-06-28
Payer: COMMERCIAL

## 2024-06-28 LAB
PATH REPORT.COMMENTS IMP SPEC: NORMAL
PATH REPORT.COMMENTS IMP SPEC: NORMAL
PATH REPORT.FINAL DX SPEC: NORMAL
PATH REPORT.GROSS SPEC: NORMAL
PATH REPORT.MICROSCOPIC SPEC OTHER STN: NORMAL
PATH REPORT.RELEVANT HX SPEC: NORMAL
PHOTO IMAGE: NORMAL

## 2024-06-28 NOTE — TELEPHONE ENCOUNTER
Spoke to Gregorio about the benign findings from her breast biopsies done earlier this week.  We discussed the Radiologist's recommendation of a 6 month follow up left breast mammogram and ultrasound.  Orders have been placed and sent to provider to sign.  Writer will follow up Gregorio on Monday, July 1st to help schedule the appointments.  Gregorio verbalized understanding of the plan.

## 2025-01-12 ENCOUNTER — HEALTH MAINTENANCE LETTER (OUTPATIENT)
Age: 35
End: 2025-01-12